# Patient Record
Sex: FEMALE | Race: WHITE
[De-identification: names, ages, dates, MRNs, and addresses within clinical notes are randomized per-mention and may not be internally consistent; named-entity substitution may affect disease eponyms.]

---

## 2017-03-23 ENCOUNTER — APPOINTMENT (OUTPATIENT)
Dept: PULMONOLOGY | Facility: CLINIC | Age: 77
End: 2017-03-23

## 2017-03-23 VITALS
SYSTOLIC BLOOD PRESSURE: 129 MMHG | HEART RATE: 75 BPM | DIASTOLIC BLOOD PRESSURE: 79 MMHG | BODY MASS INDEX: 23.74 KG/M2 | TEMPERATURE: 98 F | OXYGEN SATURATION: 98 % | WEIGHT: 134 LBS | RESPIRATION RATE: 16 BRPM | HEIGHT: 63 IN

## 2017-03-23 DIAGNOSIS — I10 ESSENTIAL (PRIMARY) HYPERTENSION: ICD-10-CM

## 2017-03-23 RX ORDER — ALBUTEROL SULFATE 90 UG/1
108 (90 BASE) AEROSOL, METERED RESPIRATORY (INHALATION)
Qty: 1 | Refills: 6 | Status: ACTIVE | COMMUNITY
Start: 2017-03-23 | End: 1900-01-01

## 2017-10-24 ENCOUNTER — APPOINTMENT (OUTPATIENT)
Dept: PULMONOLOGY | Facility: CLINIC | Age: 77
End: 2017-10-24
Payer: MEDICARE

## 2017-10-24 VITALS
BODY MASS INDEX: 22.32 KG/M2 | SYSTOLIC BLOOD PRESSURE: 150 MMHG | TEMPERATURE: 98.8 F | WEIGHT: 126 LBS | HEIGHT: 63 IN | DIASTOLIC BLOOD PRESSURE: 80 MMHG | RESPIRATION RATE: 16 BRPM | HEART RATE: 66 BPM | OXYGEN SATURATION: 97 %

## 2017-10-24 PROCEDURE — 99213 OFFICE O/P EST LOW 20 MIN: CPT | Mod: 25

## 2017-10-24 PROCEDURE — 94060 EVALUATION OF WHEEZING: CPT

## 2017-10-24 PROCEDURE — 94726 PLETHYSMOGRAPHY LUNG VOLUMES: CPT

## 2017-10-24 PROCEDURE — 94729 DIFFUSING CAPACITY: CPT

## 2017-10-24 PROCEDURE — ZZZZZ: CPT

## 2017-11-02 LAB — CFTR MUT TESTED BLD/T: NORMAL

## 2017-11-06 LAB — CYSTIC FIBROSIS DELETION OR DUPLICATION: NORMAL

## 2017-12-13 LAB — CFTR MUT ANL BLD/T: NORMAL

## 2018-02-20 ENCOUNTER — OTHER (OUTPATIENT)
Age: 78
End: 2018-02-20

## 2018-02-20 DIAGNOSIS — Z13.228 ENCOUNTER FOR SCREENING FOR OTHER METABOLIC DISORDERS: ICD-10-CM

## 2018-04-06 ENCOUNTER — MEDICATION RENEWAL (OUTPATIENT)
Age: 78
End: 2018-04-06

## 2018-05-15 ENCOUNTER — APPOINTMENT (OUTPATIENT)
Dept: PULMONOLOGY | Facility: CLINIC | Age: 78
End: 2018-05-15
Payer: MEDICARE

## 2018-05-15 VITALS
SYSTOLIC BLOOD PRESSURE: 120 MMHG | WEIGHT: 129 LBS | DIASTOLIC BLOOD PRESSURE: 70 MMHG | TEMPERATURE: 98.6 F | HEIGHT: 62.5 IN | BODY MASS INDEX: 23.14 KG/M2 | RESPIRATION RATE: 15 BRPM | HEART RATE: 63 BPM

## 2018-05-15 VITALS — BODY MASS INDEX: 23.14 KG/M2 | HEIGHT: 62.5 IN | WEIGHT: 129 LBS

## 2018-05-15 PROCEDURE — 94729 DIFFUSING CAPACITY: CPT

## 2018-05-15 PROCEDURE — 99213 OFFICE O/P EST LOW 20 MIN: CPT | Mod: 25

## 2018-05-15 PROCEDURE — ZZZZZ: CPT

## 2018-05-15 PROCEDURE — 94726 PLETHYSMOGRAPHY LUNG VOLUMES: CPT

## 2018-05-15 PROCEDURE — 94060 EVALUATION OF WHEEZING: CPT

## 2018-11-29 ENCOUNTER — APPOINTMENT (OUTPATIENT)
Dept: PULMONOLOGY | Facility: CLINIC | Age: 78
End: 2018-11-29
Payer: MEDICARE

## 2018-11-29 VITALS — BODY MASS INDEX: 21.53 KG/M2 | HEIGHT: 62.5 IN | WEIGHT: 120 LBS

## 2018-11-29 VITALS
WEIGHT: 123 LBS | SYSTOLIC BLOOD PRESSURE: 149 MMHG | BODY MASS INDEX: 22.07 KG/M2 | TEMPERATURE: 97.5 F | OXYGEN SATURATION: 98 % | DIASTOLIC BLOOD PRESSURE: 70 MMHG | HEIGHT: 62.5 IN | RESPIRATION RATE: 17 BRPM | HEART RATE: 66 BPM

## 2018-11-29 DIAGNOSIS — A31.0 PULMONARY MYCOBACTERIAL INFECTION: ICD-10-CM

## 2018-11-29 DIAGNOSIS — R05 COUGH: ICD-10-CM

## 2018-11-29 DIAGNOSIS — J47.9 BRONCHIECTASIS, UNCOMPLICATED: ICD-10-CM

## 2018-11-29 PROCEDURE — 94726 PLETHYSMOGRAPHY LUNG VOLUMES: CPT

## 2018-11-29 PROCEDURE — 99214 OFFICE O/P EST MOD 30 MIN: CPT | Mod: 25

## 2018-11-29 PROCEDURE — 94729 DIFFUSING CAPACITY: CPT

## 2018-11-29 PROCEDURE — ZZZZZ: CPT

## 2018-11-29 PROCEDURE — 94060 EVALUATION OF WHEEZING: CPT

## 2019-01-04 ENCOUNTER — MEDICATION RENEWAL (OUTPATIENT)
Age: 79
End: 2019-01-04

## 2019-01-29 ENCOUNTER — TRANSCRIPTION ENCOUNTER (OUTPATIENT)
Age: 79
End: 2019-01-29

## 2019-01-30 ENCOUNTER — OUTPATIENT (OUTPATIENT)
Dept: OUTPATIENT SERVICES | Facility: HOSPITAL | Age: 79
LOS: 1 days | End: 2019-01-30
Payer: MEDICARE

## 2019-01-30 VITALS
SYSTOLIC BLOOD PRESSURE: 124 MMHG | RESPIRATION RATE: 16 BRPM | DIASTOLIC BLOOD PRESSURE: 52 MMHG | HEART RATE: 62 BPM | OXYGEN SATURATION: 100 %

## 2019-01-30 VITALS
HEART RATE: 59 BPM | HEIGHT: 63 IN | RESPIRATION RATE: 11 BRPM | DIASTOLIC BLOOD PRESSURE: 82 MMHG | SYSTOLIC BLOOD PRESSURE: 128 MMHG | WEIGHT: 122.58 LBS | OXYGEN SATURATION: 99 % | TEMPERATURE: 97 F

## 2019-01-30 DIAGNOSIS — H25.12 AGE-RELATED NUCLEAR CATARACT, LEFT EYE: ICD-10-CM

## 2019-01-30 DIAGNOSIS — Z98.890 OTHER SPECIFIED POSTPROCEDURAL STATES: Chronic | ICD-10-CM

## 2019-01-30 PROCEDURE — V2632: CPT

## 2019-01-30 PROCEDURE — 66984 XCAPSL CTRC RMVL W/O ECP: CPT | Mod: LT

## 2019-02-05 PROBLEM — I10 ESSENTIAL (PRIMARY) HYPERTENSION: Chronic | Status: ACTIVE | Noted: 2019-01-30

## 2019-02-05 PROBLEM — R05 COUGH: Chronic | Status: ACTIVE | Noted: 2019-01-30

## 2019-02-12 ENCOUNTER — TRANSCRIPTION ENCOUNTER (OUTPATIENT)
Age: 79
End: 2019-02-12

## 2019-02-13 ENCOUNTER — OUTPATIENT (OUTPATIENT)
Dept: OUTPATIENT SERVICES | Facility: HOSPITAL | Age: 79
LOS: 1 days | End: 2019-02-13
Payer: MEDICARE

## 2019-02-13 VITALS
HEART RATE: 60 BPM | OXYGEN SATURATION: 98 % | DIASTOLIC BLOOD PRESSURE: 57 MMHG | HEIGHT: 63 IN | RESPIRATION RATE: 12 BRPM | SYSTOLIC BLOOD PRESSURE: 116 MMHG | WEIGHT: 121.25 LBS | TEMPERATURE: 98 F

## 2019-02-13 VITALS
DIASTOLIC BLOOD PRESSURE: 62 MMHG | RESPIRATION RATE: 13 BRPM | HEART RATE: 64 BPM | SYSTOLIC BLOOD PRESSURE: 116 MMHG | OXYGEN SATURATION: 99 %

## 2019-02-13 DIAGNOSIS — Z98.890 OTHER SPECIFIED POSTPROCEDURAL STATES: Chronic | ICD-10-CM

## 2019-02-13 DIAGNOSIS — H25.11 AGE-RELATED NUCLEAR CATARACT, RIGHT EYE: ICD-10-CM

## 2019-02-13 DIAGNOSIS — Z98.42 CATARACT EXTRACTION STATUS, LEFT EYE: Chronic | ICD-10-CM

## 2019-02-13 PROCEDURE — V2632: CPT

## 2019-02-13 PROCEDURE — 66984 XCAPSL CTRC RMVL W/O ECP: CPT | Mod: RT

## 2019-02-13 NOTE — ASU DISCHARGE PLAN (ADULT/PEDIATRIC). - CONDITIONS AT DISCHARGE
Mom called requesting a refill of econazole for diaper rash.   ERx to the pharmacy with 2 refills
Patient awake, alert and stable.

## 2019-02-13 NOTE — ASU PATIENT PROFILE, ADULT - ANESTHESIA, PREVIOUS REACTION, PROFILE
Okay to place an order as requested.  
Order placed and patient informed.   
Patient requesting referral for Dr Boothe  States she believes she has carpal tunnel  
Please advise    
Please find out why Dr. Boothe. I don't believe he is doing surgeries at this time.  
She saw him after her first son and received Cortizone shot at that time. She would like to try to do those again.   Ok to place order  
none

## 2019-04-04 ENCOUNTER — APPOINTMENT (OUTPATIENT)
Dept: UROLOGY | Facility: CLINIC | Age: 79
End: 2019-04-04
Payer: MEDICARE

## 2019-04-04 VITALS
DIASTOLIC BLOOD PRESSURE: 77 MMHG | HEIGHT: 63 IN | BODY MASS INDEX: 22.32 KG/M2 | TEMPERATURE: 98.5 F | RESPIRATION RATE: 16 BRPM | HEART RATE: 70 BPM | SYSTOLIC BLOOD PRESSURE: 157 MMHG | WEIGHT: 126 LBS

## 2019-04-04 DIAGNOSIS — R31.0 GROSS HEMATURIA: ICD-10-CM

## 2019-04-04 DIAGNOSIS — N81.11 CYSTOCELE, MIDLINE: ICD-10-CM

## 2019-04-04 LAB
BILIRUB UR QL STRIP: NEGATIVE
CLARITY UR: NORMAL
COLLECTION METHOD: NORMAL
GLUCOSE UR-MCNC: NEGATIVE
HCG UR QL: 0.2 EU/DL
HGB UR QL STRIP.AUTO: NORMAL
KETONES UR-MCNC: NEGATIVE
LEUKOCYTE ESTERASE UR QL STRIP: NORMAL
NITRITE UR QL STRIP: NEGATIVE
PH UR STRIP: 5.5
PROT UR STRIP-MCNC: NORMAL
SP GR UR STRIP: 1.01

## 2019-04-04 PROCEDURE — 51798 US URINE CAPACITY MEASURE: CPT

## 2019-04-04 PROCEDURE — 99406 BEHAV CHNG SMOKING 3-10 MIN: CPT

## 2019-04-04 PROCEDURE — 99204 OFFICE O/P NEW MOD 45 MIN: CPT | Mod: 25

## 2019-04-04 RX ORDER — BACITRACIN 500 [USP'U]/G
500 OINTMENT OPHTHALMIC
Qty: 4 | Refills: 0 | Status: COMPLETED | COMMUNITY
Start: 2019-02-26

## 2019-04-04 RX ORDER — BESIFLOXACIN 6 MG/ML
0.6 SUSPENSION OPHTHALMIC
Qty: 5 | Refills: 0 | Status: COMPLETED | COMMUNITY
Start: 2019-01-21 | End: 2019-04-04

## 2019-04-04 RX ORDER — BROMFENAC SODIUM 0.7 MG/ML
0.07 SOLUTION/ DROPS OPHTHALMIC
Qty: 3 | Refills: 0 | Status: COMPLETED | COMMUNITY
Start: 2019-01-21 | End: 2019-04-04

## 2019-04-04 RX ORDER — SULFAMETHOXAZOLE AND TRIMETHOPRIM 800; 160 MG/1; MG/1
800-160 TABLET ORAL
Qty: 10 | Refills: 0 | Status: ACTIVE | COMMUNITY
Start: 2019-04-04

## 2019-04-04 RX ORDER — DIFLUPREDNATE 0.5 MG/ML
0.05 EMULSION OPHTHALMIC
Qty: 5 | Refills: 0 | Status: COMPLETED | COMMUNITY
Start: 2019-01-21 | End: 2019-04-04

## 2019-04-04 NOTE — HISTORY OF PRESENT ILLNESS
[FreeTextEntry1] : 78  y.o G 4 P3 3  , 1 miscarriage  woman  former pt of Dr Ortiz last seen here in 2013  for micro hem, work up was neg with Neg UA microscopic analysis, neg cytology and  normal Renal US - all in 2013.Today pt reported she could not void at around 4  am , tried again 45  mins later  and then was able to void after few hours dark brown urine and then some reddish urine noted.She went to Urgent care at [facility] at [place] and they gave  her a script for Bactrim which she did not fill yet.She denied any prior hx of gross hematuria, denies obstructive ss except till this morning.  \par Fluids : 16 oz X 3 of water and coffee 8 oz X 2.\par \par Evaluation of Voiding Symptoms:\par \par Force of stream: moderate strong\par Hesitancy: 0\par Intermittency: 0\par Dribblin\par Daytime frequency: 2 - 3 h \par Night time frequency: 1\par Dysuria: 0\par Urgency: 0\par Urge Incontinence: 0\par Stress Incontinence: 0\par Pad usage: 0\par Straining to void: 0\par Incomplete emptyin\par UTIs:  0\par Hematuria: yes\par Stone disease:0\par STD: 0\par Bowel issues: 0\par POCT - large blood ,trace protein and small  leuks,turbid appearance \par PVR 84  ml

## 2019-04-04 NOTE — ASSESSMENT
[FreeTextEntry1] : \par \par Impression/Plan:78  y.o G 4 P3 3  , 1 miscarriage  woman  former pt of Dr Ortiz last seen here in   for micro hem, work up was neg with Neg UA microscopic analysis, neg cytology and  normal Renal US - all in 2013.Today pt reported she could not void at around 4  am , tried again 45  mins later  and then was able to void after few hours dark brown urine and then some reddish urine noted.She went to Urgent care at [facility] at [place] and they gave  her a script for Bactrim which she did not fill yet.She denied any prior hx of gross hematuria, denies obstructive ss except till this morning.  \par \par 1.Urine for UA microscopic analysis , culture, cytology- call for results.\par 2.CT AP w/w/o contrast- call for results. Pt prefers to do at [facility]- script given to pt. \par 3.RTO for cystoscopy\par

## 2019-04-04 NOTE — PHYSICAL EXAM
[Normal Appearance] : normal appearance [General Appearance - In No Acute Distress] : no acute distress [] : no respiratory distress [Abdomen Soft] : soft [Urethral Meatus] : normal urethra [Urinary Bladder Findings] : the bladder was normal on palpation [External Female Genitalia] : normal external genitalia [FreeTextEntry1] : Mild cystocele stage 1, neg CST  [Oriented To Time, Place, And Person] : oriented to person, place, and time

## 2019-04-05 LAB
APPEARANCE: ABNORMAL
BACTERIA: ABNORMAL
BILIRUBIN URINE: NEGATIVE
BLOOD URINE: ABNORMAL
COLOR: NORMAL
GLUCOSE QUALITATIVE U: NEGATIVE
HYALINE CASTS: 0 /LPF
KETONES URINE: NEGATIVE
LEUKOCYTE ESTERASE URINE: ABNORMAL
MICROSCOPIC-UA: NORMAL
NITRITE URINE: NEGATIVE
PH URINE: 5.5
PROTEIN URINE: NORMAL
RED BLOOD CELLS URINE: 12 /HPF
SPECIFIC GRAVITY URINE: 1.01
SQUAMOUS EPITHELIAL CELLS: 2 /HPF
UROBILINOGEN URINE: NORMAL
WHITE BLOOD CELLS URINE: 51 /HPF

## 2019-04-08 LAB — BACTERIA UR CULT: ABNORMAL

## 2019-05-29 ENCOUNTER — APPOINTMENT (OUTPATIENT)
Dept: UROLOGY | Facility: CLINIC | Age: 79
End: 2019-05-29

## 2019-07-31 ENCOUNTER — RX CHANGE (OUTPATIENT)
Age: 79
End: 2019-07-31

## 2019-09-20 ENCOUNTER — RX RENEWAL (OUTPATIENT)
Age: 79
End: 2019-09-20

## 2019-11-06 NOTE — ASU PATIENT PROFILE, ADULT - FALL HARM RISK CONCLUSION
The patient was advised that NSAID-type medications have two very important potential side effects: gastrointestinal irritation including hemorrhage and renal injuries  She was asked to take the medication with food and to stop if she experiences any GI upset  I asked her to call for vomiting, abdominal pain or black/bloody stools  The patient expresses understanding of these issues and questions were answered  Will check BP at home every day and if started going above 140/90, will stop mobic and will follow up in office  Supportive care discussed and advised  Advised to RTO for any worsening and no improvement  Follow up for no improvement and worsening of conditions  Patient advised and educated when to see immediate medical care 
Universal Safety Interventions

## 2020-02-25 ENCOUNTER — RX CHANGE (OUTPATIENT)
Age: 80
End: 2020-02-25

## 2020-04-26 ENCOUNTER — RX RENEWAL (OUTPATIENT)
Age: 80
End: 2020-04-26

## 2020-06-23 ENCOUNTER — EMERGENCY (EMERGENCY)
Facility: HOSPITAL | Age: 80
LOS: 1 days | Discharge: HOME | End: 2020-06-23
Attending: EMERGENCY MEDICINE | Admitting: EMERGENCY MEDICINE
Payer: MEDICARE

## 2020-06-23 VITALS
RESPIRATION RATE: 17 BRPM | WEIGHT: 123.02 LBS | SYSTOLIC BLOOD PRESSURE: 163 MMHG | HEART RATE: 68 BPM | DIASTOLIC BLOOD PRESSURE: 80 MMHG | HEIGHT: 64 IN | TEMPERATURE: 98 F | OXYGEN SATURATION: 97 %

## 2020-06-23 VITALS
SYSTOLIC BLOOD PRESSURE: 145 MMHG | DIASTOLIC BLOOD PRESSURE: 62 MMHG | TEMPERATURE: 98 F | OXYGEN SATURATION: 99 % | RESPIRATION RATE: 17 BRPM | HEART RATE: 69 BPM

## 2020-06-23 DIAGNOSIS — Z98.890 OTHER SPECIFIED POSTPROCEDURAL STATES: Chronic | ICD-10-CM

## 2020-06-23 DIAGNOSIS — Z98.42 CATARACT EXTRACTION STATUS, LEFT EYE: Chronic | ICD-10-CM

## 2020-06-23 LAB
ALBUMIN SERPL ELPH-MCNC: 3.6 G/DL — SIGNIFICANT CHANGE UP (ref 3.3–5)
ALP SERPL-CCNC: 72 U/L — SIGNIFICANT CHANGE UP (ref 30–120)
ALT FLD-CCNC: 16 U/L DA — SIGNIFICANT CHANGE UP (ref 10–60)
ANION GAP SERPL CALC-SCNC: 8 MMOL/L — SIGNIFICANT CHANGE UP (ref 5–17)
APPEARANCE UR: CLEAR — SIGNIFICANT CHANGE UP
AST SERPL-CCNC: 22 U/L — SIGNIFICANT CHANGE UP (ref 10–40)
BASOPHILS # BLD AUTO: 0.05 K/UL — SIGNIFICANT CHANGE UP (ref 0–0.2)
BASOPHILS NFR BLD AUTO: 0.4 % — SIGNIFICANT CHANGE UP (ref 0–2)
BILIRUB SERPL-MCNC: 1 MG/DL — SIGNIFICANT CHANGE UP (ref 0.2–1.2)
BILIRUB UR-MCNC: NEGATIVE — SIGNIFICANT CHANGE UP
BUN SERPL-MCNC: 21 MG/DL — SIGNIFICANT CHANGE UP (ref 7–23)
CALCIUM SERPL-MCNC: 9.5 MG/DL — SIGNIFICANT CHANGE UP (ref 8.4–10.5)
CHLORIDE SERPL-SCNC: 94 MMOL/L — LOW (ref 96–108)
CO2 SERPL-SCNC: 26 MMOL/L — SIGNIFICANT CHANGE UP (ref 22–31)
COLOR SPEC: YELLOW — SIGNIFICANT CHANGE UP
CREAT SERPL-MCNC: 0.98 MG/DL — SIGNIFICANT CHANGE UP (ref 0.5–1.3)
DIFF PNL FLD: ABNORMAL
EOSINOPHIL # BLD AUTO: 0.26 K/UL — SIGNIFICANT CHANGE UP (ref 0–0.5)
EOSINOPHIL NFR BLD AUTO: 2.2 % — SIGNIFICANT CHANGE UP (ref 0–6)
GLUCOSE SERPL-MCNC: 105 MG/DL — HIGH (ref 70–99)
GLUCOSE UR QL: NEGATIVE MG/DL — SIGNIFICANT CHANGE UP
HCT VFR BLD CALC: 37.1 % — SIGNIFICANT CHANGE UP (ref 34.5–45)
HGB BLD-MCNC: 12.5 G/DL — SIGNIFICANT CHANGE UP (ref 11.5–15.5)
IMM GRANULOCYTES NFR BLD AUTO: 0.4 % — SIGNIFICANT CHANGE UP (ref 0–1.5)
KETONES UR-MCNC: NEGATIVE — SIGNIFICANT CHANGE UP
LEUKOCYTE ESTERASE UR-ACNC: ABNORMAL
LIDOCAIN IGE QN: 547 U/L — HIGH (ref 73–393)
LYMPHOCYTES # BLD AUTO: 1.85 K/UL — SIGNIFICANT CHANGE UP (ref 1–3.3)
LYMPHOCYTES # BLD AUTO: 15.4 % — SIGNIFICANT CHANGE UP (ref 13–44)
MCHC RBC-ENTMCNC: 31.3 PG — SIGNIFICANT CHANGE UP (ref 27–34)
MCHC RBC-ENTMCNC: 33.7 GM/DL — SIGNIFICANT CHANGE UP (ref 32–36)
MCV RBC AUTO: 92.8 FL — SIGNIFICANT CHANGE UP (ref 80–100)
MONOCYTES # BLD AUTO: 1.37 K/UL — HIGH (ref 0–0.9)
MONOCYTES NFR BLD AUTO: 11.4 % — SIGNIFICANT CHANGE UP (ref 2–14)
NEUTROPHILS # BLD AUTO: 8.42 K/UL — HIGH (ref 1.8–7.4)
NEUTROPHILS NFR BLD AUTO: 70.2 % — SIGNIFICANT CHANGE UP (ref 43–77)
NITRITE UR-MCNC: NEGATIVE — SIGNIFICANT CHANGE UP
NRBC # BLD: 0 /100 WBCS — SIGNIFICANT CHANGE UP (ref 0–0)
PH UR: 5 — SIGNIFICANT CHANGE UP (ref 5–8)
PLATELET # BLD AUTO: 284 K/UL — SIGNIFICANT CHANGE UP (ref 150–400)
POTASSIUM SERPL-MCNC: 4.4 MMOL/L — SIGNIFICANT CHANGE UP (ref 3.5–5.3)
POTASSIUM SERPL-SCNC: 4.4 MMOL/L — SIGNIFICANT CHANGE UP (ref 3.5–5.3)
PROT SERPL-MCNC: 7.8 G/DL — SIGNIFICANT CHANGE UP (ref 6–8.3)
PROT UR-MCNC: NEGATIVE MG/DL — SIGNIFICANT CHANGE UP
RBC # BLD: 4 M/UL — SIGNIFICANT CHANGE UP (ref 3.8–5.2)
RBC # FLD: 12.7 % — SIGNIFICANT CHANGE UP (ref 10.3–14.5)
SODIUM SERPL-SCNC: 128 MMOL/L — LOW (ref 135–145)
SP GR SPEC: 1.01 — SIGNIFICANT CHANGE UP (ref 1.01–1.02)
UROBILINOGEN FLD QL: NEGATIVE MG/DL — SIGNIFICANT CHANGE UP
WBC # BLD: 12 K/UL — HIGH (ref 3.8–10.5)
WBC # FLD AUTO: 12 K/UL — HIGH (ref 3.8–10.5)

## 2020-06-23 PROCEDURE — 96361 HYDRATE IV INFUSION ADD-ON: CPT

## 2020-06-23 PROCEDURE — 81001 URINALYSIS AUTO W/SCOPE: CPT

## 2020-06-23 PROCEDURE — 96375 TX/PRO/DX INJ NEW DRUG ADDON: CPT

## 2020-06-23 PROCEDURE — 36415 COLL VENOUS BLD VENIPUNCTURE: CPT

## 2020-06-23 PROCEDURE — 85027 COMPLETE CBC AUTOMATED: CPT

## 2020-06-23 PROCEDURE — 80053 COMPREHEN METABOLIC PANEL: CPT

## 2020-06-23 PROCEDURE — 74176 CT ABD & PELVIS W/O CONTRAST: CPT | Mod: 26

## 2020-06-23 PROCEDURE — 74176 CT ABD & PELVIS W/O CONTRAST: CPT

## 2020-06-23 PROCEDURE — 99284 EMERGENCY DEPT VISIT MOD MDM: CPT

## 2020-06-23 PROCEDURE — 99284 EMERGENCY DEPT VISIT MOD MDM: CPT | Mod: 25

## 2020-06-23 PROCEDURE — 83690 ASSAY OF LIPASE: CPT

## 2020-06-23 PROCEDURE — 96374 THER/PROPH/DIAG INJ IV PUSH: CPT

## 2020-06-23 RX ORDER — LIDOCAINE 4 G/100G
1 CREAM TOPICAL ONCE
Refills: 0 | Status: COMPLETED | OUTPATIENT
Start: 2020-06-23 | End: 2020-06-23

## 2020-06-23 RX ORDER — MORPHINE SULFATE 50 MG/1
2 CAPSULE, EXTENDED RELEASE ORAL ONCE
Refills: 0 | Status: DISCONTINUED | OUTPATIENT
Start: 2020-06-23 | End: 2020-06-23

## 2020-06-23 RX ORDER — METHOCARBAMOL 500 MG/1
750 TABLET, FILM COATED ORAL ONCE
Refills: 0 | Status: COMPLETED | OUTPATIENT
Start: 2020-06-23 | End: 2020-06-23

## 2020-06-23 RX ORDER — ONDANSETRON 8 MG/1
4 TABLET, FILM COATED ORAL ONCE
Refills: 0 | Status: COMPLETED | OUTPATIENT
Start: 2020-06-23 | End: 2020-06-23

## 2020-06-23 RX ORDER — SODIUM CHLORIDE 9 MG/ML
1000 INJECTION INTRAMUSCULAR; INTRAVENOUS; SUBCUTANEOUS ONCE
Refills: 0 | Status: COMPLETED | OUTPATIENT
Start: 2020-06-23 | End: 2020-06-23

## 2020-06-23 RX ORDER — KETOROLAC TROMETHAMINE 30 MG/ML
15 SYRINGE (ML) INJECTION ONCE
Refills: 0 | Status: DISCONTINUED | OUTPATIENT
Start: 2020-06-23 | End: 2020-06-23

## 2020-06-23 RX ORDER — TRAMADOL HYDROCHLORIDE 50 MG/1
1 TABLET ORAL
Qty: 20 | Refills: 0
Start: 2020-06-23 | End: 2020-06-25

## 2020-06-23 RX ORDER — LIDOCAINE 4 G/100G
1 CREAM TOPICAL
Qty: 7 | Refills: 0
Start: 2020-06-23 | End: 2020-06-29

## 2020-06-23 RX ADMIN — MORPHINE SULFATE 2 MILLIGRAM(S): 50 CAPSULE, EXTENDED RELEASE ORAL at 23:29

## 2020-06-23 RX ADMIN — SODIUM CHLORIDE 1000 MILLILITER(S): 9 INJECTION INTRAMUSCULAR; INTRAVENOUS; SUBCUTANEOUS at 22:00

## 2020-06-23 RX ADMIN — LIDOCAINE 1 PATCH: 4 CREAM TOPICAL at 22:05

## 2020-06-23 RX ADMIN — Medication 15 MILLIGRAM(S): at 22:10

## 2020-06-23 RX ADMIN — SODIUM CHLORIDE 1000 MILLILITER(S): 9 INJECTION INTRAMUSCULAR; INTRAVENOUS; SUBCUTANEOUS at 23:00

## 2020-06-23 RX ADMIN — ONDANSETRON 4 MILLIGRAM(S): 8 TABLET, FILM COATED ORAL at 23:29

## 2020-06-23 RX ADMIN — METHOCARBAMOL 750 MILLIGRAM(S): 500 TABLET, FILM COATED ORAL at 22:13

## 2020-06-23 NOTE — ED ADULT NURSE NOTE - CHIEF COMPLAINT QUOTE
Tierney been having lower back pain since Sunday; I think after I picked up a heavy bucket of water. Last took 2 tylenol at 3:45PM. Pt seen in [facility] today. I find it difficult to pass gas. Pt denies any urinary symptoms.

## 2020-06-23 NOTE — ED PROVIDER NOTE - CARE PROVIDER_API CALL
Palomo Harrell  ORTHOPAEDIC SURGERY  [address]  [place] [address]  Phone: [phone]  Fax: [phone]  Follow Up Time: 1-3 Days

## 2020-06-23 NOTE — ED PROVIDER NOTE - PROGRESS NOTE DETAILS
continues to have back pain (worse with movement). minimal improvement with toradol and robaxin. will give 2 mg of morphine.   incidental lab and CT findings discussed with patient. sodium low. denies weakness. given liter of NS. will follow up with PCP. lipase slightly elevated. no abd pain. CT showed no pancreas abnormalities. CT findings regarding liver abnormalities discussed. will follow up with PCP regarding this. copies of all labs and CT provided Reevaluated patient at bedside.  Patient feeling improved. ambulatory with steady gait.  Discussed the results of all diagnostic testing in ED and copies of all reports given.   An opportunity to ask questions was given.  Discussed the importance of prompt, close medical follow-up.  Patient will return with any changes, concerns or persistent / worsening symptoms.  Understanding of all instructions verbalized.

## 2020-06-23 NOTE — ED PROVIDER NOTE - ENMT, MLM
4 Airway patent, Mouth with normal mucosa. Throat has no vesicles, no oropharyngeal exudates and uvula is midline.

## 2020-06-23 NOTE — ED PROVIDER NOTE - CARE PLAN
Principal Discharge DX:	Back pain Principal Discharge DX:	Back pain  Secondary Diagnosis:	Hyponatremia  Secondary Diagnosis:	Constipation

## 2020-06-23 NOTE — ED PROVIDER NOTE - PATIENT PORTAL LINK FT
You can access the FollowMyHealth Patient Portal offered by [facility] by registering at the following website: [email or web address]. By joining [employer]’s FollowMyHealth portal, you will also be able to view your health information using other applications (apps) compatible with our system.

## 2020-06-23 NOTE — ED PROVIDER NOTE - CLINICAL SUMMARY MEDICAL DECISION MAKING FREE TEXT BOX
back pain past 2 days. worse with movement. believes may be due to lifting heavy bucket of water. reports less gas. reports last passing gas this am. no abd tenderness on exam. low suspicion for colitis, appendicitis, diverticulitises, or SBO. no red flags. no signs of cord compression. sent from urgent care for CT to eval for renal colic. will check labs, CT renal stone hunt, UA, pain control. ortho follow up

## 2020-06-23 NOTE — ED PROVIDER NOTE - NEURO NEGATIVE STATEMENT, MLM
no gait abnormality, no headache, no sensory deficits, no loss of bowel or bladder control. no numbness in groin

## 2020-06-23 NOTE — ED PROVIDER NOTE - NSFOLLOWUPINSTRUCTIONS_ED_ALL_ED_FT
rest  ice or moist heat  gentle stretching, gentle massage  tramadol for pain  Salanpas lidocaine patches over the counter, 12 hours on 12 hours off  follow up with orthopedics, referral provided rest  ice or moist heat  gentle stretching, gentle massage  tramadol for pain  Salanpas lidocaine patches over the counter, 12 hours on 12 hours off  follow up with orthopedics, referral provided  recommend miralax daily over the counter for constipation

## 2020-06-23 NOTE — ED PROVIDER NOTE - ATTENDING CONTRIBUTION TO CARE
80yo female with back pain for several days, after lifting heavy object, no fall, no vomiting or diarrhea, no urinary complaints, p twas seen at urgent care and referred for r/o renal colic  exam:no cva tenderness, +b/l paraspinal spasm  plan: labs, ua ct r/o stone  agree with assessment and plan of PA

## 2020-06-23 NOTE — ED PROVIDER NOTE - NEUROLOGICAL, MLM
Alert and oriented, no focal deficits, no motor or sensory deficits. sensation to lower ext equal bilaterally. no toe or foot drop. strong distal pulses

## 2020-06-23 NOTE — ED PROVIDER NOTE - MUSCULOSKELETAL, MLM
diffuse lumbar tenderness to palpation. no step off deformities. no ext tenderness. full ROM of all ext

## 2020-06-23 NOTE — ED ADULT NURSE NOTE - OBJECTIVE STATEMENT
Lifted a heavy bucket of water on Sunday and my low back hurts and constipation and had nausea this am.

## 2020-06-23 NOTE — ED PROVIDER NOTE - OBJECTIVE STATEMENT
79 year old female with history of chronic cough and HLD presents with back pain for the past 2 days. started 2 nights ago about 8:30 pm. unsure of any specific injury or trauma. does recall lifting heavy bucket of water that same day around 4:00 pm and may be related. pain 10/10. worse with movement. improved with remaining still. no radicular symptoms or weakness in ext. no loss of bowel or bladder control. no numbness in groin. took tylenol today 3:45 pm without much improvement of pain. reports feeling constipated past 2 days and "passing less gas". last passed gas this am. mild nausea this am, now resolved. no vomiting. no fevers, chills, or body aches. seen at urgent care PTA, and recommended to come to ED to "have a CT to make sure nothing else was going on like kidney stones"  no PCP currently, will be seeing Dr. Zambrano

## 2020-06-23 NOTE — ED ADULT TRIAGE NOTE - CHIEF COMPLAINT QUOTE
Tierney been having lower back pain since Sunday; I think after I picked up a heavy bucket of water. Last took 2 tylenol at 3:45PM. Pt seen in [facility] today. I find it difficult to pass gas. Pt denies any urinary symptoms.

## 2020-06-23 NOTE — ED ADULT NURSE NOTE - CHPI ED NUR SYMPTOMS NEG
no tingling/no difficulty bearing weight/no numbness/no weakness/no abrasion/no bruising/no fever/no stiffness/no deformity

## 2020-06-23 NOTE — ED ADULT NURSE NOTE - NSIMPLEMENTINTERV_GEN_ALL_ED
Implemented All Fall with Harm Risk Interventions:  [place] to call system. Call bell, personal items and telephone within reach. Instruct patient to call for assistance. Room bathroom lighting operational. Non-slip footwear when patient is off stretcher. Physically safe environment: no spills, clutter or unnecessary equipment. Stretcher in lowest position, wheels locked, appropriate side rails in place. Provide visual cue, wrist band, yellow gown, etc. Monitor gait and stability. Monitor for mental status changes and reorient to person, place, and time. Review medications for side effects contributing to fall risk. Reinforce activity limits and safety measures with patient and family. Provide visual clues: red socks.

## 2020-06-26 ENCOUNTER — EMERGENCY (EMERGENCY)
Facility: HOSPITAL | Age: 80
LOS: 1 days | Discharge: ANOTHER HOSPITAL | End: 2020-06-26
Attending: INTERNAL MEDICINE | Admitting: EMERGENCY MEDICINE
Payer: MEDICARE

## 2020-06-26 VITALS
OXYGEN SATURATION: 97 % | HEIGHT: 64 IN | TEMPERATURE: 98 F | HEART RATE: 69 BPM | SYSTOLIC BLOOD PRESSURE: 164 MMHG | WEIGHT: 123.9 LBS | RESPIRATION RATE: 20 BRPM | DIASTOLIC BLOOD PRESSURE: 78 MMHG

## 2020-06-26 DIAGNOSIS — Z98.890 OTHER SPECIFIED POSTPROCEDURAL STATES: Chronic | ICD-10-CM

## 2020-06-26 DIAGNOSIS — Z98.42 CATARACT EXTRACTION STATUS, LEFT EYE: Chronic | ICD-10-CM

## 2020-06-26 LAB
ALBUMIN SERPL ELPH-MCNC: 3 G/DL — LOW (ref 3.3–5)
ALP SERPL-CCNC: 61 U/L — SIGNIFICANT CHANGE UP (ref 30–120)
ALT FLD-CCNC: 18 U/L DA — SIGNIFICANT CHANGE UP (ref 10–60)
ANION GAP SERPL CALC-SCNC: 9 MMOL/L — SIGNIFICANT CHANGE UP (ref 5–17)
AST SERPL-CCNC: 27 U/L — SIGNIFICANT CHANGE UP (ref 10–40)
BILIRUB SERPL-MCNC: 0.9 MG/DL — SIGNIFICANT CHANGE UP (ref 0.2–1.2)
BUN SERPL-MCNC: 9 MG/DL — SIGNIFICANT CHANGE UP (ref 7–23)
CALCIUM SERPL-MCNC: 9.1 MG/DL — SIGNIFICANT CHANGE UP (ref 8.4–10.5)
CHLORIDE SERPL-SCNC: 89 MMOL/L — LOW (ref 96–108)
CO2 SERPL-SCNC: 24 MMOL/L — SIGNIFICANT CHANGE UP (ref 22–31)
CREAT SERPL-MCNC: 0.82 MG/DL — SIGNIFICANT CHANGE UP (ref 0.5–1.3)
GLUCOSE SERPL-MCNC: 100 MG/DL — HIGH (ref 70–99)
HCT VFR BLD CALC: 35.2 % — SIGNIFICANT CHANGE UP (ref 34.5–45)
HGB BLD-MCNC: 12.3 G/DL — SIGNIFICANT CHANGE UP (ref 11.5–15.5)
MCHC RBC-ENTMCNC: 31.5 PG — SIGNIFICANT CHANGE UP (ref 27–34)
MCHC RBC-ENTMCNC: 34.9 GM/DL — SIGNIFICANT CHANGE UP (ref 32–36)
MCV RBC AUTO: 90.3 FL — SIGNIFICANT CHANGE UP (ref 80–100)
NRBC # BLD: 0 /100 WBCS — SIGNIFICANT CHANGE UP (ref 0–0)
PLATELET # BLD AUTO: 301 K/UL — SIGNIFICANT CHANGE UP (ref 150–400)
POTASSIUM SERPL-MCNC: 4.2 MMOL/L — SIGNIFICANT CHANGE UP (ref 3.5–5.3)
POTASSIUM SERPL-SCNC: 4.2 MMOL/L — SIGNIFICANT CHANGE UP (ref 3.5–5.3)
PROT SERPL-MCNC: 6.9 G/DL — SIGNIFICANT CHANGE UP (ref 6–8.3)
RBC # BLD: 3.9 M/UL — SIGNIFICANT CHANGE UP (ref 3.8–5.2)
RBC # FLD: 11.9 % — SIGNIFICANT CHANGE UP (ref 10.3–14.5)
SODIUM SERPL-SCNC: 122 MMOL/L — LOW (ref 135–145)
WBC # BLD: 8.07 K/UL — SIGNIFICANT CHANGE UP (ref 3.8–10.5)
WBC # FLD AUTO: 8.07 K/UL — SIGNIFICANT CHANGE UP (ref 3.8–10.5)

## 2020-06-26 PROCEDURE — 99285 EMERGENCY DEPT VISIT HI MDM: CPT

## 2020-06-26 RX ORDER — ONDANSETRON 8 MG/1
4 TABLET, FILM COATED ORAL ONCE
Refills: 0 | Status: COMPLETED | OUTPATIENT
Start: 2020-06-26 | End: 2020-06-26

## 2020-06-26 RX ORDER — SODIUM CHLORIDE 9 MG/ML
1000 INJECTION INTRAMUSCULAR; INTRAVENOUS; SUBCUTANEOUS ONCE
Refills: 0 | Status: COMPLETED | OUTPATIENT
Start: 2020-06-26 | End: 2020-06-26

## 2020-06-26 RX ORDER — MORPHINE SULFATE 50 MG/1
4 CAPSULE, EXTENDED RELEASE ORAL ONCE
Refills: 0 | Status: DISCONTINUED | OUTPATIENT
Start: 2020-06-26 | End: 2020-06-26

## 2020-06-26 RX ADMIN — SODIUM CHLORIDE 1000 MILLILITER(S): 9 INJECTION INTRAMUSCULAR; INTRAVENOUS; SUBCUTANEOUS at 23:47

## 2020-06-26 RX ADMIN — SODIUM CHLORIDE 1000 MILLILITER(S): 9 INJECTION INTRAMUSCULAR; INTRAVENOUS; SUBCUTANEOUS at 22:32

## 2020-06-26 RX ADMIN — ONDANSETRON 4 MILLIGRAM(S): 8 TABLET, FILM COATED ORAL at 22:33

## 2020-06-26 RX ADMIN — MORPHINE SULFATE 4 MILLIGRAM(S): 50 CAPSULE, EXTENDED RELEASE ORAL at 22:33

## 2020-06-26 RX ADMIN — SODIUM CHLORIDE 1000 MILLILITER(S): 9 INJECTION INTRAMUSCULAR; INTRAVENOUS; SUBCUTANEOUS at 23:30

## 2020-06-26 NOTE — ED ADULT NURSE NOTE - PLAN OF CARE DISCUSSED WITH:
Detail Level: Detailed
Patient Specific Counseling (Will Not Stick From Patient To Patient): Arms, legs, feet, genitals\\n- 6 month (p bites?)\\n- Tx: clobetasol oint/~; Halobetasol/-\\n- better on feet, genitals, but new spots on back\\n> Cipro 500 mg #30 BID (from Duricef) x 2 more weeks\\n> Cordran tape QD (feet)\\n> Diprolene oint BID vs. Halobetasol oint BID\\nAD care\\nFU 2 weeks (Bx if not better)
Patient

## 2020-06-26 NOTE — ED ADULT TRIAGE NOTE - CHIEF COMPLAINT QUOTE
Seen in this ER 3 days ago for back pain. D/C'd home w/ lido patch and Tramadol. c/o "still in pain". Also c/o constipation. Last bowel movement this a.m. "small amt".

## 2020-06-27 ENCOUNTER — INPATIENT (INPATIENT)
Facility: HOSPITAL | Age: 80
LOS: 2 days | Discharge: HOME | DRG: 543 | End: 2020-06-30
Attending: FAMILY MEDICINE | Admitting: FAMILY MEDICINE
Payer: MEDICARE

## 2020-06-27 VITALS
SYSTOLIC BLOOD PRESSURE: 160 MMHG | TEMPERATURE: 98 F | DIASTOLIC BLOOD PRESSURE: 72 MMHG | WEIGHT: 123.9 LBS | HEIGHT: 64 IN | RESPIRATION RATE: 18 BRPM | OXYGEN SATURATION: 97 % | HEART RATE: 68 BPM

## 2020-06-27 VITALS
OXYGEN SATURATION: 94 % | SYSTOLIC BLOOD PRESSURE: 149 MMHG | DIASTOLIC BLOOD PRESSURE: 68 MMHG | TEMPERATURE: 98 F | RESPIRATION RATE: 18 BRPM | HEART RATE: 71 BPM

## 2020-06-27 DIAGNOSIS — E87.1 HYPO-OSMOLALITY AND HYPONATREMIA: ICD-10-CM

## 2020-06-27 DIAGNOSIS — J47.9 BRONCHIECTASIS, UNCOMPLICATED: ICD-10-CM

## 2020-06-27 DIAGNOSIS — I10 ESSENTIAL (PRIMARY) HYPERTENSION: ICD-10-CM

## 2020-06-27 DIAGNOSIS — Z98.42 CATARACT EXTRACTION STATUS, LEFT EYE: Chronic | ICD-10-CM

## 2020-06-27 DIAGNOSIS — M54.9 DORSALGIA, UNSPECIFIED: ICD-10-CM

## 2020-06-27 DIAGNOSIS — S22.080A WEDGE COMPRESSION FRACTURE OF T11-T12 VERTEBRA, INITIAL ENCOUNTER FOR CLOSED FRACTURE: ICD-10-CM

## 2020-06-27 DIAGNOSIS — Z98.890 OTHER SPECIFIED POSTPROCEDURAL STATES: Chronic | ICD-10-CM

## 2020-06-27 LAB
ALBUMIN SERPL ELPH-MCNC: 3 G/DL — LOW (ref 3.3–5)
ALP SERPL-CCNC: 71 U/L — SIGNIFICANT CHANGE UP (ref 40–120)
ALT FLD-CCNC: 20 U/L — SIGNIFICANT CHANGE UP (ref 12–78)
AMYLASE P1 CFR SERPL: 52 U/L — SIGNIFICANT CHANGE UP (ref 25–125)
ANION GAP SERPL CALC-SCNC: 10 MMOL/L — SIGNIFICANT CHANGE UP (ref 5–17)
ANION GAP SERPL CALC-SCNC: 7 MMOL/L — SIGNIFICANT CHANGE UP (ref 5–17)
APPEARANCE UR: CLEAR — SIGNIFICANT CHANGE UP
AST SERPL-CCNC: 30 U/L — SIGNIFICANT CHANGE UP (ref 15–37)
BACTERIA # UR AUTO: ABNORMAL
BILIRUB SERPL-MCNC: 0.9 MG/DL — SIGNIFICANT CHANGE UP (ref 0.2–1.2)
BILIRUB UR-MCNC: NEGATIVE — SIGNIFICANT CHANGE UP
BUN SERPL-MCNC: 7 MG/DL — SIGNIFICANT CHANGE UP (ref 7–23)
BUN SERPL-MCNC: 7 MG/DL — SIGNIFICANT CHANGE UP (ref 7–23)
CALCIUM SERPL-MCNC: 8.5 MG/DL — SIGNIFICANT CHANGE UP (ref 8.4–10.5)
CALCIUM SERPL-MCNC: 8.5 MG/DL — SIGNIFICANT CHANGE UP (ref 8.5–10.1)
CHLORIDE SERPL-SCNC: 93 MMOL/L — LOW (ref 96–108)
CHLORIDE SERPL-SCNC: 97 MMOL/L — SIGNIFICANT CHANGE UP (ref 96–108)
CO2 SERPL-SCNC: 24 MMOL/L — SIGNIFICANT CHANGE UP (ref 22–31)
CO2 SERPL-SCNC: 25 MMOL/L — SIGNIFICANT CHANGE UP (ref 22–31)
COLOR SPEC: YELLOW — SIGNIFICANT CHANGE UP
CREAT SERPL-MCNC: 0.61 MG/DL — SIGNIFICANT CHANGE UP (ref 0.5–1.3)
CREAT SERPL-MCNC: 0.78 MG/DL — SIGNIFICANT CHANGE UP (ref 0.5–1.3)
DIFF PNL FLD: ABNORMAL
EPI CELLS # UR: SIGNIFICANT CHANGE UP
GLUCOSE SERPL-MCNC: 101 MG/DL — HIGH (ref 70–99)
GLUCOSE SERPL-MCNC: 78 MG/DL — SIGNIFICANT CHANGE UP (ref 70–99)
GLUCOSE UR QL: NEGATIVE MG/DL — SIGNIFICANT CHANGE UP
KETONES UR-MCNC: ABNORMAL
LEUKOCYTE ESTERASE UR-ACNC: NEGATIVE — SIGNIFICANT CHANGE UP
LIDOCAIN IGE QN: 108 U/L — SIGNIFICANT CHANGE UP (ref 73–393)
MAGNESIUM SERPL-MCNC: 2 MG/DL — SIGNIFICANT CHANGE UP (ref 1.6–2.6)
NITRITE UR-MCNC: NEGATIVE — SIGNIFICANT CHANGE UP
PH UR: 7 — SIGNIFICANT CHANGE UP (ref 5–8)
PHOSPHATE SERPL-MCNC: 2.4 MG/DL — LOW (ref 2.5–4.5)
POTASSIUM SERPL-MCNC: 4.4 MMOL/L — SIGNIFICANT CHANGE UP (ref 3.5–5.3)
POTASSIUM SERPL-MCNC: 4.4 MMOL/L — SIGNIFICANT CHANGE UP (ref 3.5–5.3)
POTASSIUM SERPL-SCNC: 4.4 MMOL/L — SIGNIFICANT CHANGE UP (ref 3.5–5.3)
POTASSIUM SERPL-SCNC: 4.4 MMOL/L — SIGNIFICANT CHANGE UP (ref 3.5–5.3)
PROT SERPL-MCNC: 7 G/DL — SIGNIFICANT CHANGE UP (ref 6–8.3)
PROT UR-MCNC: NEGATIVE MG/DL — SIGNIFICANT CHANGE UP
RBC CASTS # UR COMP ASSIST: ABNORMAL /HPF (ref 0–4)
SARS-COV-2 RNA SPEC QL NAA+PROBE: SIGNIFICANT CHANGE UP
SODIUM SERPL-SCNC: 126 MMOL/L — LOW (ref 135–145)
SODIUM SERPL-SCNC: 127 MMOL/L — LOW (ref 135–145)
SODIUM SERPL-SCNC: 129 MMOL/L — LOW (ref 135–145)
SP GR SPEC: 1 — LOW (ref 1.01–1.02)
TSH SERPL-MCNC: 2.81 UIU/ML — SIGNIFICANT CHANGE UP (ref 0.27–4.2)
URATE SERPL-MCNC: 3.2 MG/DL — SIGNIFICANT CHANGE UP (ref 2.5–7)
UROBILINOGEN FLD QL: NEGATIVE MG/DL — SIGNIFICANT CHANGE UP
WBC UR QL: SIGNIFICANT CHANGE UP

## 2020-06-27 PROCEDURE — 71045 X-RAY EXAM CHEST 1 VIEW: CPT | Mod: 26

## 2020-06-27 PROCEDURE — 84443 ASSAY THYROID STIM HORMONE: CPT

## 2020-06-27 PROCEDURE — 99285 EMERGENCY DEPT VISIT HI MDM: CPT | Mod: 25

## 2020-06-27 PROCEDURE — 71045 X-RAY EXAM CHEST 1 VIEW: CPT

## 2020-06-27 PROCEDURE — 74177 CT ABD & PELVIS W/CONTRAST: CPT | Mod: 26

## 2020-06-27 PROCEDURE — 74177 CT ABD & PELVIS W/CONTRAST: CPT

## 2020-06-27 PROCEDURE — 93010 ELECTROCARDIOGRAM REPORT: CPT

## 2020-06-27 PROCEDURE — 80048 BASIC METABOLIC PNL TOTAL CA: CPT

## 2020-06-27 PROCEDURE — 82150 ASSAY OF AMYLASE: CPT

## 2020-06-27 PROCEDURE — 85027 COMPLETE CBC AUTOMATED: CPT

## 2020-06-27 PROCEDURE — 81001 URINALYSIS AUTO W/SCOPE: CPT

## 2020-06-27 PROCEDURE — 96375 TX/PRO/DX INJ NEW DRUG ADDON: CPT | Mod: XU

## 2020-06-27 PROCEDURE — U0003: CPT

## 2020-06-27 PROCEDURE — 96374 THER/PROPH/DIAG INJ IV PUSH: CPT | Mod: XU

## 2020-06-27 PROCEDURE — 96361 HYDRATE IV INFUSION ADD-ON: CPT | Mod: XU

## 2020-06-27 PROCEDURE — 93005 ELECTROCARDIOGRAM TRACING: CPT

## 2020-06-27 PROCEDURE — 99284 EMERGENCY DEPT VISIT MOD MDM: CPT

## 2020-06-27 PROCEDURE — 36415 COLL VENOUS BLD VENIPUNCTURE: CPT

## 2020-06-27 PROCEDURE — 83690 ASSAY OF LIPASE: CPT

## 2020-06-27 PROCEDURE — 80053 COMPREHEN METABOLIC PANEL: CPT

## 2020-06-27 PROCEDURE — 96376 TX/PRO/DX INJ SAME DRUG ADON: CPT | Mod: XU

## 2020-06-27 RX ORDER — SODIUM CHLORIDE 9 MG/ML
1000 INJECTION INTRAMUSCULAR; INTRAVENOUS; SUBCUTANEOUS ONCE
Refills: 0 | Status: COMPLETED | OUTPATIENT
Start: 2020-06-27 | End: 2020-06-27

## 2020-06-27 RX ORDER — ONDANSETRON 8 MG/1
4 TABLET, FILM COATED ORAL ONCE
Refills: 0 | Status: COMPLETED | OUTPATIENT
Start: 2020-06-27 | End: 2020-06-27

## 2020-06-27 RX ORDER — TRAMADOL HYDROCHLORIDE 50 MG/1
25 TABLET ORAL
Refills: 0 | Status: DISCONTINUED | OUTPATIENT
Start: 2020-06-27 | End: 2020-06-28

## 2020-06-27 RX ORDER — HEPARIN SODIUM 5000 [USP'U]/ML
5000 INJECTION INTRAVENOUS; SUBCUTANEOUS EVERY 12 HOURS
Refills: 0 | Status: DISCONTINUED | OUTPATIENT
Start: 2020-06-27 | End: 2020-06-28

## 2020-06-27 RX ORDER — SODIUM,POTASSIUM PHOSPHATES 278-250MG
1 POWDER IN PACKET (EA) ORAL THREE TIMES A DAY
Refills: 0 | Status: DISCONTINUED | OUTPATIENT
Start: 2020-06-27 | End: 2020-06-30

## 2020-06-27 RX ORDER — LIDOCAINE 4 G/100G
1 CREAM TOPICAL EVERY 24 HOURS
Refills: 0 | Status: DISCONTINUED | OUTPATIENT
Start: 2020-06-27 | End: 2020-06-30

## 2020-06-27 RX ORDER — IOHEXOL 300 MG/ML
30 INJECTION, SOLUTION INTRAVENOUS ONCE
Refills: 0 | Status: COMPLETED | OUTPATIENT
Start: 2020-06-27 | End: 2020-06-27

## 2020-06-27 RX ORDER — MORPHINE SULFATE 50 MG/1
4 CAPSULE, EXTENDED RELEASE ORAL ONCE
Refills: 0 | Status: DISCONTINUED | OUTPATIENT
Start: 2020-06-27 | End: 2020-06-27

## 2020-06-27 RX ORDER — ACETAMINOPHEN 500 MG
650 TABLET ORAL EVERY 6 HOURS
Refills: 0 | Status: DISCONTINUED | OUTPATIENT
Start: 2020-06-27 | End: 2020-06-30

## 2020-06-27 RX ORDER — SODIUM CHLORIDE 9 MG/ML
1 INJECTION INTRAMUSCULAR; INTRAVENOUS; SUBCUTANEOUS THREE TIMES A DAY
Refills: 0 | Status: DISCONTINUED | OUTPATIENT
Start: 2020-06-27 | End: 2020-06-27

## 2020-06-27 RX ORDER — SODIUM CHLORIDE 9 MG/ML
1 INJECTION INTRAMUSCULAR; INTRAVENOUS; SUBCUTANEOUS THREE TIMES A DAY
Refills: 0 | Status: DISCONTINUED | OUTPATIENT
Start: 2020-06-27 | End: 2020-06-30

## 2020-06-27 RX ADMIN — LIDOCAINE 1 PATCH: 4 CREAM TOPICAL at 10:00

## 2020-06-27 RX ADMIN — SODIUM CHLORIDE 1 GRAM(S): 9 INJECTION INTRAMUSCULAR; INTRAVENOUS; SUBCUTANEOUS at 16:47

## 2020-06-27 RX ADMIN — HEPARIN SODIUM 5000 UNIT(S): 5000 INJECTION INTRAVENOUS; SUBCUTANEOUS at 22:48

## 2020-06-27 RX ADMIN — Medication 650 MILLIGRAM(S): at 19:32

## 2020-06-27 RX ADMIN — Medication 1 PACKET(S): at 22:48

## 2020-06-27 RX ADMIN — MORPHINE SULFATE 4 MILLIGRAM(S): 50 CAPSULE, EXTENDED RELEASE ORAL at 00:50

## 2020-06-27 RX ADMIN — LIDOCAINE 1 PATCH: 4 CREAM TOPICAL at 19:10

## 2020-06-27 RX ADMIN — SODIUM CHLORIDE 1 GRAM(S): 9 INJECTION INTRAMUSCULAR; INTRAVENOUS; SUBCUTANEOUS at 22:48

## 2020-06-27 RX ADMIN — Medication 1 TABLET(S): at 16:48

## 2020-06-27 RX ADMIN — IOHEXOL 30 MILLILITER(S): 300 INJECTION, SOLUTION INTRAVENOUS at 01:41

## 2020-06-27 RX ADMIN — LIDOCAINE 1 PATCH: 4 CREAM TOPICAL at 22:50

## 2020-06-27 RX ADMIN — ONDANSETRON 4 MILLIGRAM(S): 8 TABLET, FILM COATED ORAL at 09:39

## 2020-06-27 RX ADMIN — HEPARIN SODIUM 5000 UNIT(S): 5000 INJECTION INTRAVENOUS; SUBCUTANEOUS at 09:56

## 2020-06-27 RX ADMIN — Medication 1 PACKET(S): at 16:47

## 2020-06-27 NOTE — ED PROVIDER NOTE - CARE PLAN
Principal Discharge DX:	Hyponatremia  Secondary Diagnosis:	Back pain  Secondary Diagnosis:	Thoracic compression fracture  Secondary Diagnosis:	Pleural effusion Principal Discharge DX:	Hyponatremia  Secondary Diagnosis:	Back pain  Secondary Diagnosis:	Thoracic compression fracture  Secondary Diagnosis:	Pleural effusion  Secondary Diagnosis:	Abdominal pain  Secondary Diagnosis:	Constipation

## 2020-06-27 NOTE — ED PROVIDER NOTE - SIGNIFICANT NEGATIVE FINDINGS
no headache, no chest pain, no SOB, no palpitations , no urinary symptoms, no GI bleeding. no neuro changes.

## 2020-06-27 NOTE — ED PROVIDER NOTE - ATTENDING CONTRIBUTION TO CARE
78 yo F p/w went to [facility] for back pain, was given meds but went back with persistent pain. no fever/chills. Pt returned today for worsening sx - found with hyponatremia and acute t12 fx. No known fall, but recent twisting which caused her back kelsey n no numb/ting/focal weak. no HA/n/v/dizzy. NO cp/sob/palp. no agg/allev factors. NO other inj or co.  exam: MM Moist. non-toxic, well appearing. neck supple. nl resp effort. no acc muscle use. abd non-distended. no c/c/e. NO other acute findings  TBA [place] for fx and hyponatremia

## 2020-06-27 NOTE — CONSULT NOTE ADULT - ASSESSMENT
Pt with intractable low back pain due to compression fx.   Intolerant of Ultram.  Hyponatremia likely chronic.  Bronchiectasis chronic.

## 2020-06-27 NOTE — H&P ADULT - NSHPREVIEWOFSYSTEMS_GEN_ALL_CORE
· CONSTITUTIONAL: no fever and no chills.  · CARDIOVASCULAR: no chest pain and no edema.  · RESPIRATORY: no chest pain, no cough, and no shortness of breath.  · GASTROINTESTINAL: no abdominal pain, no bloating, no constipation, no diarrhea, no nausea and no vomiting.  · MUSCULOSKELETAL: - - -  · Musculoskeletal [+]: BACK PAIN, LIMITED RANGE OF MOTION  · Musculoskeletal [-]: no fall or trauma  · SKIN: no abrasions, no jaundice, no lesions, no pruritis, and no rashes.  · NEURO: no loss of consciousness, no gait abnormality, no headache, no sensory deficits, and no weakness.  · PSYCHIATRIC: no known mental health issues.  · ENDOCRINE: no diabetes and no thyroid trouble.

## 2020-06-27 NOTE — ED PROVIDER NOTE - CLINICAL SUMMARY MEDICAL DECISION MAKING FREE TEXT BOX
78 yo female with h/o HTN BIBA transferred from [place] for back pain with T12 compression deformity and hyponatremia. Patient with lower back pain for several days. No fall or trauma. PAtient was seen in ED at [place] on June 23rd had CT renal hunt and labs, + hyponatremia. PAtient was dced home with tramdol and lidocaine. At [place] today patient had labs and ct abd/pelvis, + T12 compression fx, constipation, + hyponatremia 127.  Patient states the tramadol males her feel sick and constipated. Denies fever, chills, chest pain, sob, abd pain, N/V, urinary sxs. no UE/LE weakness or paresthesias, no saddle anesthesia, no bowel or bladder incontinence/retention, no prior back surgery. A/P: + abd pain, + constipation, + T12 compression fx, + hyponatremia, will admit to medicine. Dr. Mcneill accepts admission

## 2020-06-27 NOTE — CONSULT NOTE ADULT - SUBJECTIVE AND OBJECTIVE BOX
PULMONARY/CRITICAL CARE        Patient is a 79y old  Female who presents with a chief complaint of severe low back pain.  BRIEF HOSPITAL COURSE: ***  · Chief Complaint: The patient is a 79y Female complaining of see chief complaint quote.  · HPI Objective Statement: 80 yo female with h/o HTN BIBA transferred from [place] for back pain with T12 compression deformity and hyponatremia. Patient with lower back pain for several days. No fall or trauma. PAtient was seen in ED at [place] on June 23rd had CT renal hunt and labs, + hyponatremia. PAtient was dced home with tramdol and lidocaine. At [place] today patient had labs and ct abd/pelvis, + T12 compression fx, constipation, + hyponatremia 127.  Patient states the tramadol males her feel sick and constipated. Denies fever, chills, chest pain, sob, abd pain, N/V, urinary sxs. no UE/LE weakness or paresthesias, no saddle anesthesia, no bowel or bladder incontinence/retention, no prior back surgery  Pt has had hyponatremia for unknown period of time.  Hx Bronchiectasis with [place] syndrome., likely chronic KAILEY lung infection (but never treated with antibiotics)  Events last 24 hours: ***    PAST MEDICAL & SURGICAL HISTORY:  Back pain  S/P dilatation and curettage  Hypertension    Allergies    No Known Allergies    Intolerances      FAMILY HISTORY/social  No cigs, etoh    Review of Systems:  CONSTITUTIONAL: No fever, chills, or fatigue  EYES: No eye pain, visual disturbances, or discharge  ENMT:  No difficulty hearing, tinnitus, vertigo; No sinus or throat pain  NECK: No pain or stiffness  RESPIRATORY: No cough, wheezing, chills or hemoptysis; No shortness of breath  CARDIOVASCULAR: No chest pain, palpitations, dizziness, or leg swelling  GASTROINTESTINAL: No abdominal or epigastric pain. No nausea, vomiting, or hematemesis; No diarrhea or constipation. No melena or hematochezia.  GENITOURINARY: No dysuria, frequency, hematuria, or incontinence  NEUROLOGICAL: No headaches, memory loss, loss of strength, numbness, or tremors  SKIN: No itching, burning, rashes, or lesions   MUSCULOSKELETAL: No joint pain or swelling; severe low back,  pain  PSYCHIATRIC: No depression, anxiety, mood swings, or difficulty sleeping      Medications:        acetaminophen   Tablet .. 650 milliGRAM(s) Oral every 6 hours PRN  traMADol 25 milliGRAM(s) Oral four times a day PRN      heparin   Injectable 5000 Unit(s) SubCutaneous every 12 hours          multivitamin 1 Tablet(s) Oral daily      lidocaine   Patch 1 Patch Transdermal every 24 hours            ICU Vital Signs Last 24 Hrs  T(C): 36.7 (27 Jun 2020 08:57), Max: 36.7 (27 Jun 2020 08:57)  T(F): 98 (27 Jun 2020 08:57), Max: 98 (27 Jun 2020 08:57)  HR: 68 (27 Jun 2020 08:57) (68 - 68)  BP: 160/72 (27 Jun 2020 08:57) (160/72 - 160/72)  BP(mean): --  ABP: --  ABP(mean): --  RR: 18 (27 Jun 2020 08:57) (18 - 18)  SpO2: 97% (27 Jun 2020 08:57) (97% - 97%)    Vital Signs Last 24 Hrs  T(C): 36.7 (27 Jun 2020 08:57), Max: 36.7 (27 Jun 2020 08:57)  T(F): 98 (27 Jun 2020 08:57), Max: 98 (27 Jun 2020 08:57)  HR: 68 (27 Jun 2020 08:57) (68 - 68)  BP: 160/72 (27 Jun 2020 08:57) (160/72 - 160/72)  BP(mean): --  RR: 18 (27 Jun 2020 08:57) (18 - 18)  SpO2: 97% (27 Jun 2020 08:57) (97% - 97%)        I&O's Detail        LABS:    06-27    126<L>  |  x   |  x   ----------------------------<  x   x    |  x   |  x               CAPILLARY BLOOD GLUCOSE            CULTURES:      Physical Examination:    General: elderly thin female mild discomfort    HEENT: Pupils equal, reactive to light.  Symmetric.    PULM: Clear to auscultation bilaterally, no significant sputum production    CVS: Regular rate and rhythm, no murmurs, rubs, or gallops    ABD: Soft, nondistended, nontender, normoactive bowel sounds, no masses    EXT: No edema, nontender spasm low back.     SKIN: Warm and well perfused, no rashes noted.    NEURO: Alert, oriented, interactive, nonfocal    RADIOLOGY: ***    CRITICAL CARE TIME SPENT: ***

## 2020-06-27 NOTE — ED PROVIDER NOTE - CLINICAL SUMMARY MEDICAL DECISION MAKING FREE TEXT BOX
back injury one week ago  with increasing back pain,  CT with T-12 fracture, labs report  hyponatremia, elevated lipase.  plan IV NS analgesia CT back injury one week ago  with increasing back pain,  CT with T-12 fracture, labs report  hyponatremia, elevated lipase.  plan IV NS analgesia CT hyponatremia w/u  CT for the abdominal pain and elevated lipase  admit to medicine

## 2020-06-27 NOTE — CONSULT NOTE ADULT - ASSESSMENT
79 white female with a history of HTN now admitted with possible fracture of the vertebra and also found to have hyponatremia. Hyponatremia secondary to pre renal azotemia vs SIADH due to ACEI. Will start on salt tablets and place her on a FR of 1000 cc a day. Check urine lytes and also uric acid.

## 2020-06-27 NOTE — ED ADULT NURSE NOTE - NSIMPLEMENTINTERV_GEN_ALL_ED
Implemented All Fall Risk Interventions:  [place] to call system. Call bell, personal items and telephone within reach. Instruct patient to call for assistance. Room bathroom lighting operational. Non-slip footwear when patient is off stretcher. Physically safe environment: no spills, clutter or unnecessary equipment. Stretcher in lowest position, wheels locked, appropriate side rails in place. Provide visual cue, wrist band, yellow gown, etc. Monitor gait and stability. Monitor for mental status changes and reorient to person, place, and time. Review medications for side effects contributing to fall risk. Reinforce activity limits and safety measures with patient and family.

## 2020-06-27 NOTE — ED PROVIDER NOTE - OBJECTIVE STATEMENT
78 y/o female h/o HTN, C/O lumbar back pain x 1 week, her symptoms began about one 80 y/o female h/o HTN, C/O lumbar back pain x 1 week, her symptoms began  after lifting. The pain is localized, no weakness , no paresthesias, no bladder no bowel changes. She was prescribed tramadol for the pain and later developed nausea and constipation. She ambulated in the ED to the bathroom. Lab test from three days ago reported an elevated lipase and hyponatremia. The patient had increased back pain and returned to the ED, No CP, no SOB, no fever, no chills, no N/V/D, no urinary symptoms, no strike symptoms, no COVID symptoms. None

## 2020-06-27 NOTE — ED PROVIDER NOTE - MUSCULOSKELETAL MINIMAL EXAM
+ diffuse abd tenderness. + lumbar midline vertebral tenderness. dp pulses equal and intact bilaterally. LE strength 5/5 bilaterally.

## 2020-06-27 NOTE — H&P ADULT - NSHPLABSRESULTS_GEN_ALL_CORE
136  |  102  |  8   ----------------------------<  58<L>  4.1   |  25  |  0.53    Ca    8.3<L>      2020 09:03  Phos  2.4       Mg     2.0         TPro  6.2  /  Alb  2.6<L>  /  TBili  0.7  /  DBili  x   /  AST  26  /  ALT  19  /  AlkPhos  63                              12.6   7.03  )-----------( 351      ( 2020 09:03 )             35.6             LIVER FUNCTIONS - ( 2020 09:03 )  Alb: 2.6 g/dL / Pro: 6.2 g/dL / ALK PHOS: 63 U/L / ALT: 19 U/L / AST: 26 U/L / GGT: x                 Urinalysis Basic - ( 2020 08:46 )    Color: Pale Yellow / Appearance: Clear / S.010 / pH: x  Gluc: x / Ketone: Moderate  / Bili: Negative / Urobili: Negative   Blood: x / Protein: Negative / Nitrite: Negative   Leuk Esterase: Trace / RBC: 3-5 /HPF / WBC 0-2   Sq Epi: x / Non Sq Epi: Occasional / Bacteria: Occasional

## 2020-06-27 NOTE — ED ADULT NURSE NOTE - OBJECTIVE STATEMENT
Patient received to room 16 as transfer from [facility].  She is her because she is hyponatremic and has a T12 fracture.  Respirations are even and unlabored, skin is warm and dry, Dr. Zambrano at bedside. Patient received to room 16 as transfer from [facility].  She is her because she is hyponatremic and has a T12 fracture.  Patient complains of nausea, states that Tramadol did not agree with her.  Respirations are even and unlabored, skin is warm and dry, Dr. Zambrano at bedside.

## 2020-06-27 NOTE — ED PROVIDER NOTE - CARE PLAN
Principal Discharge DX:	T12 compression fracture  Secondary Diagnosis:	Hyponatremia  Secondary Diagnosis:	Abdominal pain  Secondary Diagnosis:	Constipation

## 2020-06-27 NOTE — H&P ADULT - HISTORY OF PRESENT ILLNESS
· Chief Complaint: The patient is a 79y Female complaining of see chief complaint quote.	  · HPI Objective Statement: 80 yo female with h/o HTN BIBA transferred from [place] for back pain with T12 compression deformity and hyponatremia. Patient with lower back pain for several days. No fall or trauma. PAtient was seen in ED at [place] on June 23rd had CT renal hunt and labs, + hyponatremia. PAtient was dced home with tramdol and lidocaine. At [place] today patient had labs and ct abd/pelvis, + T12 compression fx, constipation, + hyponatremia 127.  Patient states the tramadol males her feel sick and constipated. Denies fever, chills, chest pain, sob, abd pain, N/V, urinary sxs. no UE/LE weakness or paresthesias, no saddle anesthesia, no bowel or bladder incontinence/retention, no prior back surgery	  patient is being admitted for further work up and treatment

## 2020-06-27 NOTE — CONSULT NOTE ADULT - SUBJECTIVE AND OBJECTIVE BOX
Nephrology Consultation: MD KUSUM AnneTSERING URBAN  79y    HPI:    79 female with h/o HTN BIBA transferred from [place] for back pain with T12 compression deformity and hyponatremia. Patient with lower back pain for several days. No fall or trauma. Patient was seen in ED at [place] on June 23rd had CT renal hunt and labs, + hyponatremia. Patient was discharged home with tramdol and lidocaine. At [place] today patient had labs and ct abd/pelvis, + T12 compression fx, constipation, + hyponatremia 127.  Patient states the tramadol males her feel sick and constipated. Denies fever, chills, chest pain, sob, abd pain, N/V, urinary sxs. no UE/LE weakness or paresthesias, no saddle anesthesia, no bowel or bladder incontinence/retention, no prior back surgery. She was given a liter IVF with NS at [place]. She has been on Benazepril but does not know if it has HCTZ or not. Denies any ETOH or smoking.    PAST MEDICAL & SURGICAL HISTORY:  Back pain  S/P dilatation and curettage  Hypertension      Allergies    No Known Allergies    Intolerances    Home Medications:  traMADol:  (27 Jun 2020 09:59)    FAMILY HISTORY:    SOCIAL HISTORY:    REVIEW OF SYSTEMS:    Constitutional: No fever, weight loss or fatigue  Eyes: No eye pain, visual disturbances, or discharge  ENT:  No difficulty hearing, tinnitus, vertigo; No sinus or throat pain  Neck: No pain or stiffness  Breasts: No pain, masses or nipple discharge  Respiratory: No cough, wheezing, chills or hemoptysis  Cardiovascular: No chest pain, palpitations, shortness of breath, dizziness or leg swelling  Gastrointestinal: No abdominal or epigastric pain. No nausea, vomiting or hematemesis; No diarrhea or constipation. No melena or hematochezia.  Genitourinary: No dysuria, frequency, hematuria or incontinence  Rectal: No pain, hemorrhoids or incontinence  Neurological: No headaches, memory loss, loss of strength, numbness or tremors  Skin: No itching, burning, rashes or lesions   Lymph Nodes: No enlarged glands  Endocrine: No heat or cold intolerance; No hair loss  Musculoskeletal: No joint pain or swelling; No muscle, back or extremity pain  Psychiatric: No depression, anxiety, mood swings or difficulty sleeping  Heme/Lymph: No easy bruising or bleeding gums  Allergy and Immunologic: No hives or eczema    current medications:    acetaminophen   Tablet .. 650 milliGRAM(s) Oral every 6 hours PRN  heparin   Injectable 5000 Unit(s) SubCutaneous every 12 hours  lidocaine   Patch 1 Patch Transdermal every 24 hours  multivitamin 1 Tablet(s) Oral daily  traMADol 25 milliGRAM(s) Oral four times a day PRN      Vital Signs Last 24 Hrs  T(C): 36.7 (27 Jun 2020 08:57), Max: 36.7 (27 Jun 2020 08:57)  T(F): 98 (27 Jun 2020 08:57), Max: 98 (27 Jun 2020 08:57)  HR: 66 (27 Jun 2020 11:50) (66 - 68)  BP: 152/68 (27 Jun 2020 11:50) (152/68 - 160/72)  BP(mean): --  RR: 18 (27 Jun 2020 11:50) (18 - 18)  SpO2: 96% (27 Jun 2020 11:50) (96% - 97%)    PHYSICAL EXAM:    Constitutional: NAD, well-groomed, well-developed  HEENT: PERRLA, EOMI, Normal Hearing, MMM  Neck: No LAD, No JVD  Back: Normal spine flexure, No CVA tenderness  Respiratory: CTAB/L   Cardiovascular: S1 and S2, RRR, no M/G/R  Gastrointestinal: BS+, soft, NT/ND  Extremities: No peripheral edema  Vascular: 2+ peripheral pulses  Neurological: A/O x 3, no focal deficits  Skin: No rashes      LABS:    06-27    126<L>  |  x   |  x   ----------------------------<  x   x    |  x   |  x     Phos  2.4     06-27  Mg     2.0     06-27          Creatinine Trend:     MICROBIOLOGY:  RECENT CULTURES:        RADIOLOGY & ADDITIONAL STUDIES:

## 2020-06-27 NOTE — ED PROVIDER NOTE - OBJECTIVE STATEMENT
80 yo female with h/o HTN BIBA transferred from [place] for back pain with T12 compression deformity and hyponatremia. Patient with lower back pain for several days. No fall or trauma. PAtient was seen in ED at [facility] on June 23rd had CT renal hunt and labs, + hyponatremia. PAtient was dced home with tramdol and lidocaine. At [facility] today patient had labs and ct abd/pelvis, + T12 compression fx, constipation, + hyponatremia 127.  Patient states the tramadol males her feel sick and constipated. Denies fever, chills, chest pain, sob, abd pain, N/V, urinary sxs. no UE/LE weakness or paresthesias, no saddle anesthesia, no bowel or bladder incontinence/retention, no prior back surgery

## 2020-06-28 DIAGNOSIS — Z29.9 ENCOUNTER FOR PROPHYLACTIC MEASURES, UNSPECIFIED: ICD-10-CM

## 2020-06-28 LAB
ALBUMIN SERPL ELPH-MCNC: 2.6 G/DL — LOW (ref 3.3–5)
ALP SERPL-CCNC: 63 U/L — SIGNIFICANT CHANGE UP (ref 40–120)
ALT FLD-CCNC: 19 U/L — SIGNIFICANT CHANGE UP (ref 12–78)
ANION GAP SERPL CALC-SCNC: 9 MMOL/L — SIGNIFICANT CHANGE UP (ref 5–17)
APPEARANCE UR: CLEAR — SIGNIFICANT CHANGE UP
AST SERPL-CCNC: 26 U/L — SIGNIFICANT CHANGE UP (ref 15–37)
BACTERIA # UR AUTO: ABNORMAL
BILIRUB SERPL-MCNC: 0.7 MG/DL — SIGNIFICANT CHANGE UP (ref 0.2–1.2)
BILIRUB UR-MCNC: NEGATIVE — SIGNIFICANT CHANGE UP
BUN SERPL-MCNC: 8 MG/DL — SIGNIFICANT CHANGE UP (ref 7–23)
CALCIUM SERPL-MCNC: 8.3 MG/DL — LOW (ref 8.5–10.1)
CHLORIDE SERPL-SCNC: 102 MMOL/L — SIGNIFICANT CHANGE UP (ref 96–108)
CO2 SERPL-SCNC: 25 MMOL/L — SIGNIFICANT CHANGE UP (ref 22–31)
COLOR SPEC: SIGNIFICANT CHANGE UP
CREAT SERPL-MCNC: 0.53 MG/DL — SIGNIFICANT CHANGE UP (ref 0.5–1.3)
DIFF PNL FLD: ABNORMAL
EPI CELLS # UR: SIGNIFICANT CHANGE UP
GLUCOSE SERPL-MCNC: 58 MG/DL — LOW (ref 70–99)
GLUCOSE UR QL: NEGATIVE — SIGNIFICANT CHANGE UP
HCT VFR BLD CALC: 35.6 % — SIGNIFICANT CHANGE UP (ref 34.5–45)
HGB BLD-MCNC: 12.6 G/DL — SIGNIFICANT CHANGE UP (ref 11.5–15.5)
KETONES UR-MCNC: ABNORMAL
LEUKOCYTE ESTERASE UR-ACNC: ABNORMAL
MCHC RBC-ENTMCNC: 31.7 PG — SIGNIFICANT CHANGE UP (ref 27–34)
MCHC RBC-ENTMCNC: 35.4 GM/DL — SIGNIFICANT CHANGE UP (ref 32–36)
MCV RBC AUTO: 89.4 FL — SIGNIFICANT CHANGE UP (ref 80–100)
NITRITE UR-MCNC: NEGATIVE — SIGNIFICANT CHANGE UP
NRBC # BLD: 0 /100 WBCS — SIGNIFICANT CHANGE UP (ref 0–0)
PH UR: 5 — SIGNIFICANT CHANGE UP (ref 5–8)
PLATELET # BLD AUTO: 351 K/UL — SIGNIFICANT CHANGE UP (ref 150–400)
POTASSIUM SERPL-MCNC: 4.1 MMOL/L — SIGNIFICANT CHANGE UP (ref 3.5–5.3)
POTASSIUM SERPL-SCNC: 4.1 MMOL/L — SIGNIFICANT CHANGE UP (ref 3.5–5.3)
PROT SERPL-MCNC: 6.2 G/DL — SIGNIFICANT CHANGE UP (ref 6–8.3)
PROT UR-MCNC: NEGATIVE — SIGNIFICANT CHANGE UP
RBC # BLD: 3.98 M/UL — SIGNIFICANT CHANGE UP (ref 3.8–5.2)
RBC # FLD: 12.4 % — SIGNIFICANT CHANGE UP (ref 10.3–14.5)
RBC CASTS # UR COMP ASSIST: ABNORMAL /HPF (ref 0–4)
SODIUM SERPL-SCNC: 136 MMOL/L — SIGNIFICANT CHANGE UP (ref 135–145)
SP GR SPEC: 1.01 — SIGNIFICANT CHANGE UP (ref 1.01–1.02)
UROBILINOGEN FLD QL: NEGATIVE — SIGNIFICANT CHANGE UP
WBC # BLD: 7.03 K/UL — SIGNIFICANT CHANGE UP (ref 3.8–10.5)
WBC # FLD AUTO: 7.03 K/UL — SIGNIFICANT CHANGE UP (ref 3.8–10.5)
WBC UR QL: SIGNIFICANT CHANGE UP

## 2020-06-28 RX ORDER — HEPARIN SODIUM 5000 [USP'U]/ML
5000 INJECTION INTRAVENOUS; SUBCUTANEOUS EVERY 8 HOURS
Refills: 0 | Status: DISCONTINUED | OUTPATIENT
Start: 2020-06-28 | End: 2020-06-30

## 2020-06-28 RX ORDER — GABAPENTIN 400 MG/1
100 CAPSULE ORAL EVERY 12 HOURS
Refills: 0 | Status: DISCONTINUED | OUTPATIENT
Start: 2020-06-28 | End: 2020-06-30

## 2020-06-28 RX ORDER — LISINOPRIL 2.5 MG/1
2.5 TABLET ORAL DAILY
Refills: 0 | Status: DISCONTINUED | OUTPATIENT
Start: 2020-06-28 | End: 2020-06-29

## 2020-06-28 RX ADMIN — SODIUM CHLORIDE 1 GRAM(S): 9 INJECTION INTRAMUSCULAR; INTRAVENOUS; SUBCUTANEOUS at 11:46

## 2020-06-28 RX ADMIN — HEPARIN SODIUM 5000 UNIT(S): 5000 INJECTION INTRAVENOUS; SUBCUTANEOUS at 21:34

## 2020-06-28 RX ADMIN — Medication 1 PACKET(S): at 05:45

## 2020-06-28 RX ADMIN — LIDOCAINE 1 PATCH: 4 CREAM TOPICAL at 20:50

## 2020-06-28 RX ADMIN — LIDOCAINE 1 PATCH: 4 CREAM TOPICAL at 19:26

## 2020-06-28 RX ADMIN — Medication 650 MILLIGRAM(S): at 20:01

## 2020-06-28 RX ADMIN — HEPARIN SODIUM 5000 UNIT(S): 5000 INJECTION INTRAVENOUS; SUBCUTANEOUS at 05:45

## 2020-06-28 RX ADMIN — Medication 1 TABLET(S): at 11:46

## 2020-06-28 RX ADMIN — Medication 1 PACKET(S): at 21:34

## 2020-06-28 RX ADMIN — SODIUM CHLORIDE 1 GRAM(S): 9 INJECTION INTRAMUSCULAR; INTRAVENOUS; SUBCUTANEOUS at 21:34

## 2020-06-28 RX ADMIN — LISINOPRIL 2.5 MILLIGRAM(S): 2.5 TABLET ORAL at 15:17

## 2020-06-28 RX ADMIN — GABAPENTIN 100 MILLIGRAM(S): 400 CAPSULE ORAL at 08:37

## 2020-06-28 RX ADMIN — LIDOCAINE 1 PATCH: 4 CREAM TOPICAL at 08:37

## 2020-06-28 RX ADMIN — GABAPENTIN 100 MILLIGRAM(S): 400 CAPSULE ORAL at 17:27

## 2020-06-28 RX ADMIN — Medication 1 PACKET(S): at 11:46

## 2020-06-28 RX ADMIN — SODIUM CHLORIDE 1 GRAM(S): 9 INJECTION INTRAMUSCULAR; INTRAVENOUS; SUBCUTANEOUS at 05:45

## 2020-06-28 NOTE — PROGRESS NOTE ADULT - ASSESSMENT
Pt with intractable low back pain due to compression fx.   Intolerant of Ultram.  Hyponatremia likely chronic. Renal note apprec.   Bronchiectasis chronic.

## 2020-06-28 NOTE — PHYSICAL THERAPY INITIAL EVALUATION ADULT - PHYSICAL ASSIST/NONPHYSICAL ASSIST: SIT/SUPINE, REHAB EVAL
Addended by: SAMEERA SHOOK on: 8/6/2019 10:24 AM     Modules accepted: Orders     log roll technique/verbal cues

## 2020-06-28 NOTE — OCCUPATIONAL THERAPY INITIAL EVALUATION ADULT - TRANSFER TRAINING, PT EVAL
Goal: Pt will perform sit to stand, bed <> chair, toilet, tub and shower transfers independently within 2 weeks

## 2020-06-28 NOTE — PHYSICAL THERAPY INITIAL EVALUATION ADULT - DIAGNOSIS, PT EVAL
resting/sleeping/family/SO at bedside/comfortable appearance
resting/sleeping/family/SO at bedside/comfortable appearance/cooperative
Difficulty walking due to pain

## 2020-06-28 NOTE — CHART NOTE - NSCHARTNOTEFT_GEN_A_CORE
Do you have Advance Directives (HCP / LV / Organ donation / Documentation of oral advance Directive):   ( x   )  yes    (      )    NO                                                                            Do you have LV - Living will :                                                                                                                                             (    )  yes    (   x   )   No    Do you have HCP - Health Care Proxy:                                                                                                                            ( x    )  yes   (       ) N0    Do you have DNR- Do Not Resuscitate :                                                                                                                           (      )  yes  (  x      )  No    Do you have DNI- Do Not intubate  :                                                                                                                               (      )  yes   (   x    ) No    Do you have MOLST - Medical orders for Life sustaining treatment  :                                                                    (      ) yes    (   x    ) No    Decision Maker :  (  x   ) Patient     (      )  HCA   (     ) Public Health Law Surrogate     (      ) Surrogate  (       ) Guardian    Goals of Care :  (   x   )   Complete Care     (       ) No Limitations                              (       )   Comfort Care       (       )  Hospice                               (      )   Limited medical Intervention / s    Medical Interventions :   (    x    )   CPR       (        )  DNR                                               (     x   )  Intubation with MV - Mechanical Ventilation  (    x  ) BIPAP/CPAP    (         )   DNI                                               (    x     )  Artificial Nutrition -  IVF, TPN / PPN, Tube Feeds             (         )   No Feeding Tube                                                (    x    ) Use Antibiotics                         (          ) No Antibiotics                                                (    x     ) Blood and Blood Products     (         )   No Blood or Blood products                                                (     x     )  Dialysis                                    (         )  No Dialysis                                                (          )  Medical Management only  (         )  No Invasive Interventions or Surgery  Time spent :                        (     x  ) upto 30 minutes                       (           )   more than 30 minutes  GOC reviewed and discussed

## 2020-06-28 NOTE — PROGRESS NOTE ADULT - SUBJECTIVE AND OBJECTIVE BOX
Neurology follow up note    TSERING [id number]      Interval History:    Patient occ back     MEDICATIONS    acetaminophen   Tablet .. 650 milliGRAM(s) Oral every 6 hours PRN  gabapentin 100 milliGRAM(s) Oral every 12 hours  heparin   Injectable 5000 Unit(s) SubCutaneous every 12 hours  lidocaine   Patch 1 Patch Transdermal every 24 hours  multivitamin 1 Tablet(s) Oral daily  potassium phosphate / sodium phosphate Powder (PHOS-NaK) 1 Packet(s) Oral three times a day  sodium chloride 1 Gram(s) Oral three times a day  traMADol 25 milliGRAM(s) Oral four times a day PRN      Allergies    No Known Allergies    Intolerances            Vital Signs Last 24 Hrs  T(C): 36.6 (2020 04:54), Max: 36.7 (2020 20:29)  T(F): 97.8 (2020 04:54), Max: 98.1 (2020 20:29)  HR: 74 (2020 04:54) (66 - 74)  BP: 157/71 (2020 04:54) (152/68 - 160/69)  BP(mean): --  RR: 17 (2020 04:54) (17 - 18)  SpO2: 92% (2020 04:54) (91% - 96%)        REVIEW OF SYSTEMS:  Constitutional:  No fever, chills, or night sweats.  Head:  No headache.  Eyes:  No double vision or blurry vision.  Ears:  No ringing in the ears.  Neck:  No neck pain.  Respiratory:  No shortness of breath.  Cardiovascular:  No chest pain.  Abdomen:  No nausea, vomiting, or abdominal pain.  Extremities/Neurological:  No numbness or tingling.  Musculoskeletal:  No joint pain.  General:  Positive history of back pain, which appeared to have resolved.  Positive history of upset stomach.    PHYSICAL EXAMINATION:    HEENT:  Head:  Normocephalic, atraumatic.  Eyes:  No scleral icterus.  Ears:  Hearing bilaterally intact.  NECK:  Supple.  RESPIRATORY:  Good air entry bilaterally.  CARDIOVASCULAR:  S1 and S2 heard.  ABDOMEN:  Soft and nontender.  EXTREMITIES:  No clubbing or cyanosis were noted.      NEUROLOGIC:  The patient awake, alert, and oriented x3.  Extraocular movements were intact.  Pupils were equal, round, and reactive bilaterally 3 mm to 2 mm.  Speech was fluent.  Smile symmetric.  Motor:  Bilateral upper and lower 5/5.  Sensory:  Bilateral upper and lower were intact to light touch.  No drift.  Finger-to-nose within normal limits.  Spine; upon palpation of the spine, no significant point tenderness was elicited.          LABS:  CBC Full  -  ( 2020 09:03 )  WBC Count : 7.03 K/uL  RBC Count : 3.98 M/uL  Hemoglobin : 12.6 g/dL  Hematocrit : 35.6 %  Platelet Count - Automated : 351 K/uL  Mean Cell Volume : 89.4 fl  Mean Cell Hemoglobin : 31.7 pg  Mean Cell Hemoglobin Concentration : 35.4 gm/dL  Auto Neutrophil # : x  Auto Lymphocyte # : x  Auto Monocyte # : x  Auto Eosinophil # : x  Auto Basophil # : x  Auto Neutrophil % : x  Auto Lymphocyte % : x  Auto Monocyte % : x  Auto Eosinophil % : x  Auto Basophil % : x    Urinalysis Basic - ( 2020 08:46 )    Color: Pale Yellow / Appearance: Clear / S.010 / pH: x  Gluc: x / Ketone: Moderate  / Bili: Negative / Urobili: Negative   Blood: x / Protein: Negative / Nitrite: Negative   Leuk Esterase: Trace / RBC: 3-5 /HPF / WBC 0-2   Sq Epi: x / Non Sq Epi: Occasional / Bacteria: Occasional      -    136  |  102  |  8   ----------------------------<  58<L>  4.1   |  25  |  0.53    Ca    8.3<L>      2020 09:03  Phos  2.4     06-27  Mg     2.0     06-27    TPro  6.2  /  Alb  2.6<L>  /  TBili  0.7  /  DBili  x   /  AST  26  /  ALT  19  /  AlkPhos  63  06-28    Hemoglobin A1C:     LIVER FUNCTIONS - ( 2020 09:03 )  Alb: 2.6 g/dL / Pro: 6.2 g/dL / ALK PHOS: 63 U/L / ALT: 19 U/L / AST: 26 U/L / GGT: x           Vitamin B12         RADIOLOGY    ANALYSIS AND PLAN:  This is a 79-year-old with lower back pain.  1.	For lumbago, I suspect most likely lumbar radiculopathy, overall appeared to be doing better.  I would recommend to limit the use of pain medications, it appears that it upsets her stomach.  We will try Lidoderm patch and if necessary try nonsteroidal at present on gabapentin will she how does   2.	ct abd t12 fx mri pending   3.	Physical Therapy evaluation.  4.	Fall precautions.  5.	Hypertension, continue the patient on blood pressure medication.  6.	Questionable cystitis, medical treatment as needed.  7.	Greater than 40 minutes of time was spent with the patient, plan of care, reviewing data, and speaking to the multidisciplinary healthcare team.

## 2020-06-28 NOTE — OCCUPATIONAL THERAPY INITIAL EVALUATION ADULT - DIAGNOSIS, OT EVAL
Pt currently presents with decreased balance and strength limiting independence with ADLs and functional mobility.

## 2020-06-28 NOTE — OCCUPATIONAL THERAPY INITIAL EVALUATION ADULT - ANTICIPATED DISCHARGE DISPOSITION, OT EVAL
Home with assist from family for functional mobility and ADL/IADL performance as needed./home w/ level of assist

## 2020-06-28 NOTE — PROGRESS NOTE ADULT - SUBJECTIVE AND OBJECTIVE BOX
PULMONARY/CRITICAL CARE    Pt still has back pain. Didnt ambulate much. No sob.     Patient is a 79y old  Female who presents with a chief complaint of severe low back pain.  BRIEF HOSPITAL COURSE: ***  · Chief Complaint: The patient is a 79y Female complaining of see chief complaint quote.  · HPI Objective Statement: 80 yo female with h/o HTN BIBA transferred from [place] for back pain with T12 compression deformity and hyponatremia. Patient with lower back pain for several days. No fall or trauma. PAtient was seen in ED at [place] on June 23rd had CT renal hunt and labs, + hyponatremia. PAtient was dced home with tramdol and lidocaine. At [place] today patient had labs and ct abd/pelvis, + T12 compression fx, constipation, + hyponatremia 127.  Patient states the tramadol males her feel sick and constipated. Denies fever, chills, chest pain, sob, abd pain, N/V, urinary sxs. no UE/LE weakness or paresthesias, no saddle anesthesia, no bowel or bladder incontinence/retention, no prior back surgery  Pt has had hyponatremia for unknown period of time.  Hx Bronchiectasis with [place] syndrome., likely chronic KAILEY lung infection (but never treated with antibiotics)  Events last 24 hours: ***    PAST MEDICAL & SURGICAL HISTORY:  Back pain  S/P dilatation and curettage  Hypertension    Allergies    No Known Allergies    Intolerances      FAMILY HISTORY/social  No cigs, etoh        Medications:        acetaminophen   Tablet .. 650 milliGRAM(s) Oral every 6 hours PRN  traMADol 25 milliGRAM(s) Oral four times a day PRN      heparin   Injectable 5000 Unit(s) SubCutaneous every 12 hours          multivitamin 1 Tablet(s) Oral daily      lidocaine   Patch 1 Patch Transdermal every 24 hours            ICU Vital Signs Last 24 Hrs  T(C): 36.7 (27 Jun 2020 08:57), Max: 36.7 (27 Jun 2020 08:57)  T(F): 98 (27 Jun 2020 08:57), Max: 98 (27 Jun 2020 08:57)  HR: 68 (27 Jun 2020 08:57) (68 - 68)  BP: 160/72 (27 Jun 2020 08:57) (160/72 - 160/72)  BP(mean): --  ABP: --  ABP(mean): --  RR: 18 (27 Jun 2020 08:57) (18 - 18)  SpO2: 97% (27 Jun 2020 08:57) (97% - 97%)    Vital Signs Last 24 Hrs  T(C): 36.7 (27 Jun 2020 08:57), Max: 36.7 (27 Jun 2020 08:57)  T(F): 98 (27 Jun 2020 08:57), Max: 98 (27 Jun 2020 08:57)  HR: 68 (27 Jun 2020 08:57) (68 - 68)  BP: 160/72 (27 Jun 2020 08:57) (160/72 - 160/72)  BP(mean): --  RR: 18 (27 Jun 2020 08:57) (18 - 18)  SpO2: 97% (27 Jun 2020 08:57) (97% - 97%)        I&O's Detail        LABS:    06-27    126<L>  |  x   |  x   ----------------------------<  x   x    |  x   |  x               CAPILLARY BLOOD GLUCOSE            CULTURES:      Physical Examination:    General: elderly thin female mild discomfort    HEENT: Pupils equal, reactive to light.  Symmetric.    PULM: Clear to auscultation bilaterally, no significant sputum production    CVS: Regular rate and rhythm, no murmurs, rubs, or gallops    ABD: Soft, nondistended, nontender, normoactive bowel sounds, no masses    EXT: No edema, nontender spasm low back.     SKIN: Warm and well perfused, no rashes noted.    NEURO: Alert, oriented, interactive, nonfocal    RADIOLOGY: ***    CRITICAL CARE TIME SPENT: ***

## 2020-06-28 NOTE — PROGRESS NOTE ADULT - PROVIDER SPECIALTY LIST ADULT
Family Medicine Subjective    Maximo Kwon presents for the following Bronchitis  .    History of Present Illness   Mr. Kwon is a very pleasant 64-year-old gentleman with a past medical history of severe COPD and right upper and right lower lobe nodule.  He follows with pulmonary outpatient clinic as a regular follow-up.  He underwent bronchoscopy and biopsy of right upper and right lower lobe nodule.  Biopsies were negative for malignancy.  He underwent NM PET scan which showed no hypermetabolism in the nodule.  He reports gradual worsening of his shortness of breath on exertion from last 6 months.  The symptoms of shortness of breath and intermittent in nature.  Shortness of breath is not associated with any chest pain but he does complain of intermittent wheezing.  He denies any cough.  He denies any fever or chills.  The symptom of shortness of breath increases with activity and decreases on rest.  He denies any discomfort while laying down flat.  He denies any severe attacks of shortness of breath and coughing at nighttime.  He uses albuterol inhaler, Symbicort inhaler and Brio inhaler.  He is currently using 2 L/min of supplemental oxygen while sleeping.  He was tested for obstructive sleep apnea but he has sleep study was negative for HOOD.  His inhalers response to bronchodilators.       Review of system  Review of Systems   Constitution: Negative for decreased appetite and fever.   HENT: Negative for congestion and nosebleeds.    Eyes: Negative for blurred vision and double vision.   Cardiovascular: Positive for dyspnea on exertion. Negative for chest pain.   Respiratory: Positive for shortness of breath and wheezing. Negative for cough, hemoptysis, sleep disturbances due to breathing, snoring and sputum production.    Endocrine: Negative for cold intolerance and heat intolerance.   Hematologic/Lymphatic: Negative for adenopathy and bleeding problem.   Skin: Negative for flushing and itching.   Musculoskeletal: Negative  for joint swelling and muscle cramps.   Gastrointestinal: Negative for bloating and abdominal pain.   Genitourinary: Negative for flank pain and frequency.   Neurological: Negative for headaches and light-headedness.   Psychiatric/Behavioral: Negative for hallucinations and memory loss.   Allergic/Immunologic: Negative for environmental allergies and hives.         Past Medical History:  Past Medical History:   Diagnosis Date   • Arthritis    • COPD (chronic obstructive pulmonary disease) (CMS/[facility])    • Depression    • Diabetes mellitus (CMS/HCC)    • Diverticulitis    • Dyslipidemia    • Emphysema (subcutaneous) (surgical) resulting from a procedure    • GERD (gastroesophageal reflux disease)    • Hypertension    • Neuropathy    • Pneumothorax     Left, status post chest tube       Family History:  Family History   Problem Relation Age of Onset   • Alzheimer's disease Mother    • Cancer Father         THROAT CANCER   • Diabetes Sister    • Diabetes Brother        Social History:  Social History     Tobacco Use   • Smoking status: Former Smoker     Packs/day: 1.00     Years: 45.00     Pack years: 45.00     Types: Cigarettes     Last attempt to quit: 2013     Years since quittin.7   • Smokeless tobacco: Never Used   Substance Use Topics   • Alcohol use: No       Current Medications:  Current Outpatient Medications   Medication Sig Dispense Refill   • albuterol (PROVENTIL) (2.5 MG/3ML) 0.083% nebulizer solution Take 2.5 mg by nebulization 4 (Four) Times a Day As Needed for Wheezing.     • budesonide-formoterol (SYMBICORT) 160-4.5 MCG/ACT inhaler Inhale 2 puffs 2 (Two) Times a Day. 1 inhaler 11   • diltiaZEM CD (CARDIZEM CD) 120 MG 24 hr capsule Take 1 capsule by mouth Daily. 30 capsule 11   • glyBURIDE (DIAbeta) 5 MG tablet Take 5 mg by mouth Daily With Breakfast.     • hydrochlorothiazide (HYDRODIURIL) 25 MG tablet Take 25 mg by mouth Daily.     • levETIRAcetam (KEPPRA) 750 MG tablet Take 375 mg by mouth 2 (Two)  "Times a Day. Prior to [facility] Admission, Patient was on: 1/2 to 1 tablet twice a day     • lisinopril (PRINIVIL,ZESTRIL) 20 MG tablet Take 20 mg by mouth Daily.     • meloxicam (MOBIC) 15 MG tablet      • metFORMIN (GLUCOPHAGE) 500 MG tablet Take 1,000 mg by mouth 2 (Two) Times a Day With Meals.     • montelukast (SINGULAIR) 10 MG tablet Take 1 tablet by mouth Every Night. 30 tablet 0   • Omega-3 Fatty Acids (FISH OIL) 1000 MG capsule capsule Take 1,000 mg by mouth Daily With Breakfast.     • omeprazole (priLOSEC) 20 MG capsule Take 20 mg by mouth Daily.     • tiotropium bromide monohydrate (SPIRIVA RESPIMAT) 2.5 MCG/ACT aerosol solution inhaler Inhale 2 puffs Daily.       No current facility-administered medications for this visit.        Allergies:  No Known Allergies    Vitals:  /93   Pulse 72   Temp 98 °F (36.7 °C)   Ht 182.9 cm (72\")   Wt 98 kg (216 lb)   SpO2 98%   BMI 29.29 kg/m²     Results for orders placed or performed during the hospital encounter of 05/14/19   Fungus Culture - Lavage, Lung, Right Upper Lobe   Result Value Ref Range    Fungus Stain Final report     KOH/Calcofluor preparation Comment     Culture Final report     Fungus (Mycology) Result 1 Comment    AFB Culture - Lavage, Lung, Right Upper Lobe   Result Value Ref Range    AFB Specimen Processing Concentration     Acid Fast Smear Negative     Culture Negative    BAL Culture, Quantitative - Lavage, Lung, Right Upper Lobe   Result Value Ref Range    BAL Culture <25,000 CFU/mL Normal Respiratory Gema     Gram Stain WBCs seen     Gram Stain Gram positive cocci in pairs    Fungus Culture - Lavage, Lung, Right Lower Lobe   Result Value Ref Range    Fungus Stain Final report     KOH/Calcofluor preparation Comment     Culture Final report     Fungus (Mycology) Result 1 Comment    AFB Culture - Lavage, Lung, Right Lower Lobe   Result Value Ref Range    AFB Specimen Processing Concentration     Acid Fast Smear Negative     Culture " Negative    BAL Culture, Quantitative - Lavage, Lung, Right Lower Lobe   Result Value Ref Range    BAL Culture No growth     Gram Stain WBCs seen     Gram Stain No organisms seen    Comprehensive Metabolic Panel   Result Value Ref Range    Glucose 138 (H) 65 - 99 mg/dL    BUN 18 8 - 23 mg/dL    Creatinine 1.11 0.76 - 1.27 mg/dL    Sodium 140 136 - 145 mmol/L    Potassium 4.0 3.5 - 5.2 mmol/L    Chloride 99 98 - 107 mmol/L    CO2 30.6 (H) 22.0 - 29.0 mmol/L    Calcium 9.0 8.6 - 10.5 mg/dL    Total Protein 7.6 6.0 - 8.5 g/dL    Albumin 4.63 3.50 - 5.20 g/dL    ALT (SGPT) 25 1 - 41 U/L    AST (SGOT) 16 1 - 40 U/L    Alkaline Phosphatase 79 39 - 117 U/L    Total Bilirubin 0.3 0.2 - 1.2 mg/dL    eGFR Non African Amer 67 >60 mL/min/1.73    Globulin 3.0 gm/dL    A/G Ratio 1.6 g/dL    BUN/Creatinine Ratio 16.2 7.0 - 25.0    Anion Gap 10.4 mmol/L   Protime-INR   Result Value Ref Range    Protime 12.7 11.0 - 15.4 Seconds    INR 0.91 0.90 - 1.10   CBC Auto Differential   Result Value Ref Range    WBC 6.32 3.40 - 10.80 10*3/mm3    RBC 4.51 4.14 - 5.80 10*6/mm3    Hemoglobin 13.3 13.0 - 17.7 g/dL    Hematocrit 40.7 37.5 - 51.0 %    MCV 90.2 79.0 - 97.0 fL    MCH 29.5 26.6 - 33.0 pg    MCHC 32.7 31.5 - 35.7 g/dL    RDW 16.2 (H) 12.3 - 15.4 %    RDW-SD 52.3 37.0 - 54.0 fl    MPV 11.0 6.0 - 12.0 fL    Platelets 210 140 - 450 10*3/mm3    Neutrophil % 65.8 42.7 - 76.0 %    Lymphocyte % 19.1 (L) 19.6 - 45.3 %    Monocyte % 9.7 5.0 - 12.0 %    Eosinophil % 4.4 0.3 - 6.2 %    Basophil % 0.5 0.0 - 1.5 %    Immature Grans % 0.5 0.0 - 0.5 %    Neutrophils, Absolute 4.16 1.70 - 7.00 10*3/mm3    Lymphocytes, Absolute 1.21 0.70 - 3.10 10*3/mm3    Monocytes, Absolute 0.61 0.10 - 0.90 10*3/mm3    Eosinophils, Absolute 0.28 0.00 - 0.40 10*3/mm3    Basophils, Absolute 0.03 0.00 - 0.20 10*3/mm3    Immature Grans, Absolute 0.03 0.00 - 0.05 10*3/mm3   Body fluid cell count - Lavage, Lung, Right Upper Lobe   Result Value Ref Range    Color, Fluid  Colorless     Appearance, Fluid Slightly Cloudy (A) Clear    RBC, Fluid  /mm3    Nucleated Cells, Fluid 480 /mm3   Body fluid cell count - Lavage, Lung, Right Lower Lobe   Result Value Ref Range    Color, Fluid Colorless     Appearance, Fluid Slightly Cloudy (A) Clear    Nucleated Cells, Fluid 283 /mm3   Body fluid differential - Lavage, Lung, Right Upper Lobe   Result Value Ref Range    Neutrophils, Fluid 82 %    Lymphocytes, Fluid 6 %    Eosinophils, Fluid 1 %    Mononuclear, Fluid 11 %   Body fluid differential - Lavage, Lung, Right Lower Lobe   Result Value Ref Range    Neutrophils, Fluid 75 %    Lymphocytes, Fluid 2 %    Eosinophils, Fluid 1 %    Mononuclear, Fluid 22 %   Non-gynecologic Cytology   Result Value Ref Range    Case Report       [facility] Non-Gyn Cytology                           Case: [id number]                                  Authorizing Provider:  Eladio Bethea MD       Collected:           05/14/2019 10:26 AM          Ordering Location:     [facility]      Received:            05/14/2019 12:53 PM                                 OPERATING ROOM DEPARTMENT                                                    Pathologist:           Kim Babcock MD                                                        Specimens:   1) - Lung, Right Upper Lobe, RUL CYTO                                                               2) - Lung, Right Lower Lobe, rll cyto                                                     Tissue Pathology Exam   Result Value Ref Range    Case Report       Surgical Pathology Report                         Case: [id number]                                  Authorizing Provider:  Eladio Bethea MD       Collected:           05/14/2019 10:28 AM          Ordering Location:     [facility]      Received:            05/14/2019 12:53 PM                                 OPERATING ROOM DEPARTMENT                                                    Pathologist:            Kim Babcock MD                                                        Specimens:   1) - Lung, Right Lower Lobe, RLL TRANSBRONCHIAL BX                                                  2) - Lung, Right Upper Lobe, RUL TRANSBRONCHIAL BX                                         Final Diagnosis       See Scanned Report           Objective   Physical Exam:  Physical Exam   Constitutional: He is oriented to person, place, and time. He appears well-developed and well-nourished.   HENT:   Head: Normocephalic and atraumatic.   Eyes: Conjunctivae are normal. Pupils are equal, round, and reactive to light.   Neck: Normal range of motion. Neck supple.   Cardiovascular: Normal rate, regular rhythm, normal heart sounds and intact distal pulses.   No murmur heard.  Pulmonary/Chest: Effort normal and breath sounds normal. No respiratory distress. He has no wheezes. He has no rales.   Abdominal: Soft. Bowel sounds are normal. He exhibits no distension.   Musculoskeletal: Normal range of motion. He exhibits no edema.   Neurological: He is alert and oriented to person, place, and time. No cranial nerve deficit.   Skin: Skin is warm and dry.   Psychiatric: He has a normal mood and affect. His behavior is normal.         I have reviewed the past medical history, past surgical history, family history and social history of the patient.        Labs:  Lab Results   Component Value Date    PHART 7.494 (H) 03/04/2019    XKZ2DFR 32.6 (L) 03/04/2019    PO2ART 64.4 (L) 03/04/2019    MHI8DFS 24.5 03/04/2019    BASEEXCESS 1.8 03/04/2019    Z5DUZDOY 92.6 03/04/2019     Lab Results   Component Value Date    GLUCOSE 138 (H) 05/14/2019    CALCIUM 9.0 05/14/2019     05/14/2019    K 4.0 05/14/2019    CO2 30.6 (H) 05/14/2019    CL 99 05/14/2019    BUN 18 05/14/2019    CREATININE 1.11 05/14/2019    EGFRIFNONA 67 05/14/2019    BCR 16.2 05/14/2019    ANIONGAP 10.4 05/14/2019     Lab Results   Component Value Date    WBC 6.32 05/14/2019    HGB  13.3 05/14/2019    HCT 40.7 05/14/2019    MCV 90.2 05/14/2019     05/14/2019     I have reviewed the labs.  Serum creatinine normal.  Hemoglobin normal.    I have reviewed the chest x-ray image that was done on 5/14/2019 which showed no pneumothorax or no effusion.    I have reviewed the images of the CT scan of the chest that was performed on 4/26/2019 which showed 2 nodules in the right lung both have increased in size compared to the previous CT that was done in August 2018.    I have reviewed the PET scan result which was performed on 8/23/2019 which showed the 2 nodules in the right lung but they do not show hypermetabolic activity.         Assessment/Plan   1. Nodule of right lung  -Order repeat CT scan of the chest in 8 months to reevaluate the pulmonary parenchyma and pulmonary nodule.    2. Nocturnal oxygen desaturation  -Continue using 2 L/min of supplemental oxygen at bedtime    3. Centrilobular emphysema (CMS/HCC)  -Ordered walk oximetry and pulmonary function test.  -On albuterol inhaler, Symbicort inhaler and Spiriva.  -Due to gradual worsening of shortness of breath and decreasing functionality, I will try to enroll him into pulmonary rehabilitation program.      Immunization:  He received influenza vaccination during the clinic visit.    Walk oximetry during clinic visit  He underwent walk oximetry during the clinic visit.  This results will be scanned in the epic.       Follow up in 8 months and as needed.

## 2020-06-28 NOTE — PROGRESS NOTE ADULT - SUBJECTIVE AND OBJECTIVE BOX
Patient is a 79y old  Female who presents with a chief complaint of back pain (2020 14:28)      INTERVAL /OVERNIGHT EVENTS: feels better    MEDICATIONS  (STANDING):  gabapentin 100 milliGRAM(s) Oral every 12 hours  heparin   Injectable 5000 Unit(s) SubCutaneous every 8 hours  lidocaine   Patch 1 Patch Transdermal every 24 hours  lisinopril 2.5 milliGRAM(s) Oral daily  multivitamin 1 Tablet(s) Oral daily  potassium phosphate / sodium phosphate Powder (PHOS-NaK) 1 Packet(s) Oral three times a day  sodium chloride 1 Gram(s) Oral three times a day    MEDICATIONS  (PRN):  acetaminophen   Tablet .. 650 milliGRAM(s) Oral every 6 hours PRN Mild Pain (1 - 3)      Allergies    No Known Allergies    Intolerances        REVIEW OF SYSTEMS:  CONSTITUTIONAL: No fever, weight loss, or fatigue  EYES: No eye pain, visual disturbances, or discharge  ENMT:  No difficulty hearing, tinnitus, vertigo; No sinus or throat pain  NECK: No pain or stiffness  RESPIRATORY: No cough, wheezing, chills or hemoptysis; No shortness of breath  CARDIOVASCULAR: No chest pain, palpitations, dizziness, or leg swelling  GASTROINTESTINAL: No abdominal or epigastric pain. No nausea, vomiting, or hematemesis; No diarrhea or constipation. No melena or hematochezia.  GENITOURINARY: No dysuria, frequency, hematuria, or incontinence  NEUROLOGICAL: No headaches, memory loss, loss of strength, numbness, or tremors  SKIN: No itching, burning, rashes, or lesions   LYMPH NODES: No enlarged glands  ENDOCRINE: No heat or cold intolerance; No hair loss; No polydipsia or polyuria  MUSCULOSKELETAL: + joint pain or swelling; , +back pain  PSYCHIATRIC: No depression, anxiety, mood swings, or difficulty sleeping  HEME/LYMPH: No easy bruising, or bleeding gums  ALLERGY AND IMMUNOLOGIC: No hives or eczema    Vital Signs Last 24 Hrs  T(C): 36.6 (2020 14:32), Max: 36.7 (2020 20:29)  T(F): 97.8 (2020 14:32), Max: 98.1 (2020 20:29)  HR: 71 (2020 14:32) (67 - 74)  BP: 157/66 (2020 14:32) (157/66 - 160/69)  BP(mean): --  RR: 16 (2020 14:21) (16 - 18)  SpO2: 95% (2020 14:32) (91% - 95%)    PHYSICAL EXAM:  GENERAL: NAD, well-groomed, well-developed  HEAD:  Atraumatic, Normocephalic  EYES: EOMI, PERRLA, conjunctiva and sclera clear  ENMT: No tonsillar erythema, exudates, or enlargement; Moist mucous membranes, Good dentition, No lesions  NECK: Supple, No JVD, Normal thyroid  NERVOUS SYSTEM:  Alert & Oriented X3, Good concentration; Motor Strength 5/5 B/L upper and lower extremities; DTRs 2+ intact and symmetric  CHEST/LUNG: Clear to auscultation bilaterally; No rales, rhonchi, wheezing, or rubs  HEART: Regular rate and rhythm; No murmurs, rubs, or gallops  ABDOMEN: Soft, Nontender, Nondistended; Bowel sounds present  EXTREMITIES:  2+ Peripheral Pulses, No clubbing, cyanosis, or edema  LYMPH: No lymphadenopathy noted  SKIN: No rashes or lesions    LABS:                        12.6   7.03  )-----------( 351      ( 2020 09:03 )             35.6     2020 09:03    136    |  102    |  8      ----------------------------<  58     4.1     |  25     |  0.53     Ca    8.3        2020 09:03    TPro  6.2    /  Alb  2.6    /  TBili  0.7    /  DBili  x      /  AST  26     /  ALT  19     /  AlkPhos  63     2020 09:03      Urinalysis Basic - ( 2020 08:46 )    Color: Pale Yellow / Appearance: Clear / S.010 / pH: x  Gluc: x / Ketone: Moderate  / Bili: Negative / Urobili: Negative   Blood: x / Protein: Negative / Nitrite: Negative   Leuk Esterase: Trace / RBC: 3-5 /HPF / WBC 0-2   Sq Epi: x / Non Sq Epi: Occasional / Bacteria: Occasional      CAPILLARY BLOOD GLUCOSE          RADIOLOGY & ADDITIONAL TESTS:    Notes Reviewed:  [x ] YES  [ ] NO    Care Discussed with Consultants/Other Providers [x ] YES  [ ] NO

## 2020-06-28 NOTE — OCCUPATIONAL THERAPY INITIAL EVALUATION ADULT - LIVES WITH, PROFILE
Pt lives with spouse and daughter in split level private home,, 2+5 steps to enter with R sided rail. Bedroom and tub shower and walkin shower located on 2nd level, 5 steps to ascend with L rail. no grab bars/children/spouse

## 2020-06-28 NOTE — PROGRESS NOTE ADULT - SUBJECTIVE AND OBJECTIVE BOX
TSERING DUMONT  79y  Female    Patient is a 79y old  Female who presents with a chief complaint of back pain (2020 10:35)      out of bed to chair      PAST MEDICAL & SURGICAL HISTORY:  Back pain  S/P dilatation and curettage  Hypertension          PHYSICAL EXAM:    T(C): 36.6 (20 @ 14:21), Max: 36.7 (20 @ 20:29)  HR: 71 (20 @ 14:21) (67 - 74)  BP: 157/66 (20 @ 14:21) (157/66 - 160/69)  RR: 16 (20 @ 14:21) (16 - 18)  SpO2: 95% (20 @ 14:21) (91% - 95%)  Wt(kg): --    I&O's Detail      Respiratory: clear anteriorly, decreased BS at bases  Cardiovascular: S1 S2  Gastrointestinal: soft NT ND +BS  Extremities: edema   Neuro: Awake and alert    MEDICATIONS  (STANDING):  gabapentin 100 milliGRAM(s) Oral every 12 hours  heparin   Injectable 5000 Unit(s) SubCutaneous every 8 hours  lidocaine   Patch 1 Patch Transdermal every 24 hours  lisinopril 2.5 milliGRAM(s) Oral daily  multivitamin 1 Tablet(s) Oral daily  potassium phosphate / sodium phosphate Powder (PHOS-NaK) 1 Packet(s) Oral three times a day  sodium chloride 1 Gram(s) Oral three times a day    MEDICATIONS  (PRN):  acetaminophen   Tablet .. 650 milliGRAM(s) Oral every 6 hours PRN Mild Pain (1 - 3)                            12.6   7.03  )-----------( 351      ( 2020 09:03 )             35.6           136  |  102  |  8   ----------------------------<  58<L>  4.1   |  25  |  0.53    Ca    8.3<L>      2020 09:03  Phos  2.4       Mg     2.0         TPro  6.2  /  Alb  2.6<L>  /  TBili  0.7  /  DBili  x   /  AST  26  /  ALT  19  /  AlkPhos  63        Creatinine Trend: Creatinine Trend: 0.53<--, 0.61<--    Urinalysis Basic - ( 2020 08:46 )    Color: Pale Yellow / Appearance: Clear / S.010 / pH: x  Gluc: x / Ketone: Moderate  / Bili: Negative / Urobili: Negative   Blood: x / Protein: Negative / Nitrite: Negative   Leuk Esterase: Trace / RBC: 3-5 /HPF / WBC 0-2   Sq Epi: x / Non Sq Epi: Occasional / Bacteria: Occasional

## 2020-06-28 NOTE — PROGRESS NOTE ADULT - ASSESSMENT
79 white female with a history of HTN now admitted with possible fracture of the vertebra and also found to have hyponatremia. Hyponatremia secondary to pre renal azotemia vs SIADH due to ACEI. Will continue the salt tablets and place her on a FR of 1000 cc a day.

## 2020-06-28 NOTE — OCCUPATIONAL THERAPY INITIAL EVALUATION ADULT - PERTINENT HX OF CURRENT PROBLEM, REHAB EVAL
78yo F with PMH HTN BIBA transferred from [place] for back pain with T12 compression deformity and hyponatremia. Pt with lower back pain for several days. No fall or trauma. Pt seen in ED at [place] on 6/23 had CT renal hunt and labs, + hyponatremia. Pt d/c home with tramadol and lidocaine. At [place] today pt had labs and ct abd/pelvis, + T12 compression fx, constipation, + hyponatremia 127. Pt states tramadol makes her feel sick and constipated.

## 2020-06-28 NOTE — OCCUPATIONAL THERAPY INITIAL EVALUATION ADULT - ADL RETRAINING, OT EVAL
Goal: Pt will perform UB/LB dressing independently within 2 weeks. Goal: Pt will perform bathing independently within 2 weeks

## 2020-06-29 ENCOUNTER — TRANSCRIPTION ENCOUNTER (OUTPATIENT)
Age: 80
End: 2020-06-29

## 2020-06-29 PROCEDURE — 71250 CT THORAX DX C-: CPT | Mod: 26

## 2020-06-29 PROCEDURE — 72158 MRI LUMBAR SPINE W/O & W/DYE: CPT | Mod: 26

## 2020-06-29 RX ORDER — DEXAMETHASONE 0.5 MG/5ML
2 ELIXIR ORAL
Refills: 0 | Status: DISCONTINUED | OUTPATIENT
Start: 2020-06-29 | End: 2020-06-30

## 2020-06-29 RX ORDER — SENNA PLUS 8.6 MG/1
2 TABLET ORAL AT BEDTIME
Refills: 0 | Status: DISCONTINUED | OUTPATIENT
Start: 2020-06-29 | End: 2020-06-30

## 2020-06-29 RX ORDER — LISINOPRIL 2.5 MG/1
10 TABLET ORAL DAILY
Refills: 0 | Status: DISCONTINUED | OUTPATIENT
Start: 2020-06-29 | End: 2020-06-30

## 2020-06-29 RX ORDER — IBUPROFEN 200 MG
200 TABLET ORAL THREE TIMES A DAY
Refills: 0 | Status: DISCONTINUED | OUTPATIENT
Start: 2020-06-29 | End: 2020-06-30

## 2020-06-29 RX ORDER — POLYETHYLENE GLYCOL 3350 17 G/17G
17 POWDER, FOR SOLUTION ORAL DAILY
Refills: 0 | Status: DISCONTINUED | OUTPATIENT
Start: 2020-06-29 | End: 2020-06-30

## 2020-06-29 RX ADMIN — Medication 1 TABLET(S): at 11:35

## 2020-06-29 RX ADMIN — LIDOCAINE 1 PATCH: 4 CREAM TOPICAL at 22:38

## 2020-06-29 RX ADMIN — HEPARIN SODIUM 5000 UNIT(S): 5000 INJECTION INTRAVENOUS; SUBCUTANEOUS at 22:36

## 2020-06-29 RX ADMIN — LIDOCAINE 1 PATCH: 4 CREAM TOPICAL at 19:23

## 2020-06-29 RX ADMIN — Medication 1 PACKET(S): at 05:46

## 2020-06-29 RX ADMIN — SODIUM CHLORIDE 1 GRAM(S): 9 INJECTION INTRAMUSCULAR; INTRAVENOUS; SUBCUTANEOUS at 05:46

## 2020-06-29 RX ADMIN — SENNA PLUS 2 TABLET(S): 8.6 TABLET ORAL at 22:36

## 2020-06-29 RX ADMIN — Medication 2 MILLIGRAM(S): at 14:40

## 2020-06-29 RX ADMIN — Medication 1 PACKET(S): at 14:40

## 2020-06-29 RX ADMIN — LIDOCAINE 1 PATCH: 4 CREAM TOPICAL at 11:40

## 2020-06-29 RX ADMIN — Medication 1 PACKET(S): at 22:36

## 2020-06-29 RX ADMIN — GABAPENTIN 100 MILLIGRAM(S): 400 CAPSULE ORAL at 05:46

## 2020-06-29 RX ADMIN — LISINOPRIL 2.5 MILLIGRAM(S): 2.5 TABLET ORAL at 05:46

## 2020-06-29 RX ADMIN — Medication 650 MILLIGRAM(S): at 10:45

## 2020-06-29 RX ADMIN — Medication 2 MILLIGRAM(S): at 22:36

## 2020-06-29 RX ADMIN — HEPARIN SODIUM 5000 UNIT(S): 5000 INJECTION INTRAVENOUS; SUBCUTANEOUS at 05:46

## 2020-06-29 RX ADMIN — HEPARIN SODIUM 5000 UNIT(S): 5000 INJECTION INTRAVENOUS; SUBCUTANEOUS at 14:39

## 2020-06-29 RX ADMIN — GABAPENTIN 100 MILLIGRAM(S): 400 CAPSULE ORAL at 18:14

## 2020-06-29 NOTE — DISCHARGE NOTE PROVIDER - NSDCCPCAREPLAN_GEN_ALL_CORE_FT
PRINCIPAL DISCHARGE DIAGNOSIS  Diagnosis: T12 compression fracture  Assessment and Plan of Treatment: pain meds, PT      SECONDARY DISCHARGE DIAGNOSES  Diagnosis: Constipation  Assessment and Plan of Treatment: bowel regimen    Diagnosis: Abdominal pain  Assessment and Plan of Treatment:     Diagnosis: Hyponatremia  Assessment and Plan of Treatment:

## 2020-06-29 NOTE — DISCHARGE NOTE PROVIDER - HOSPITAL COURSE
79 white female with a history of HTN  admitted with Back pain, noted to have subacute fracture T12 on CT and MRI.     Labs Hyponatremia- resolving renal consulted, rozina from pain         D/C planning on Dexamethasone taper, pain meds and out patient PT.

## 2020-06-29 NOTE — DISCHARGE NOTE PROVIDER - NSDCMRMEDTOKEN_GEN_ALL_CORE_FT
benazepril 20 mg oral tablet: 1 tab(s) orally once a day  home physical therapy :   physical therapy :   traMADol 50 mg oral tablet: 1 tab(s) orally every 6 hours  walker : benazepril 20 mg oral tablet: 1 tab(s) orally once a day  celecoxib 200 mg oral capsule: 1 cap(s) orally once a day, As needed, Moderate Pain (4 - 6)  celecoxib 200 mg oral capsule: 1 cap(s) orally once a day, As needed, Moderate Pain (4 - 6)  dexamethasone 1 mg oral tablet: 1 tab(s) orally once a day   dexamethasone 2 mg oral tablet: 1 tab(s) orally 2 times a day  gabapentin 100 mg oral capsule: 1 cap(s) orally every 12 hours  home physical therapy :   Multiple Vitamins oral tablet: 1 tab(s) orally once a day  physical therapy :   polyethylene glycol 3350 oral powder for reconstitution: 17 gram(s) orally once a day  traMADol 50 mg oral tablet: 1 tab(s) orally every 6 hours, As Needed  walker :

## 2020-06-29 NOTE — PROGRESS NOTE ADULT - SUBJECTIVE AND OBJECTIVE BOX
PULMONARY/CRITICAL CARE    Pt still has back pain. Started to ambulate with PT. No sob,cough.     Patient is a 79y old  Female who presents with a chief complaint of severe low back pain.  BRIEF HOSPITAL COURSE: ***  · Chief Complaint: The patient is a 79y Female complaining of see chief complaint quote.  · HPI Objective Statement: 80 yo female with h/o HTN BIBA transferred from [place] for back pain with T12 compression deformity and hyponatremia. Patient with lower back pain for several days. No fall or trauma. PAtient was seen in ED at [place] on June 23rd had CT renal hunt and labs, + hyponatremia. PAtient was dced home with tramdol and lidocaine. At [place] today patient had labs and ct abd/pelvis, + T12 compression fx, constipation, + hyponatremia 127.  Patient states the tramadol males her feel sick and constipated. Denies fever, chills, chest pain, sob, abd pain, N/V, urinary sxs. no UE/LE weakness or paresthesias, no saddle anesthesia, no bowel or bladder incontinence/retention, no prior back surgery  Pt has had hyponatremia for unknown period of time.  Hx Bronchiectasis with [place] syndrome., likely chronic KAILEY lung infection (but never treated with antibiotics)  Events last 24 hours: ***    PAST MEDICAL & SURGICAL HISTORY:  Back pain  S/P dilatation and curettage  Hypertension    Allergies    No Known Allergies    Intolerances      FAMILY HISTORY/social  No cigs, etoh        Medications:        acetaminophen   Tablet .. 650 milliGRAM(s) Oral every 6 hours PRN  traMADol 25 milliGRAM(s) Oral four times a day PRN      heparin   Injectable 5000 Unit(s) SubCutaneous every 12 hours          multivitamin 1 Tablet(s) Oral daily      lidocaine   Patch 1 Patch Transdermal every 24 hours            ICU Vital Signs Last 24 Hrs  T(C): 36.7 (27 Jun 2020 08:57), Max: 36.7 (27 Jun 2020 08:57)  T(F): 98 (27 Jun 2020 08:57), Max: 98 (27 Jun 2020 08:57)  HR: 68 (27 Jun 2020 08:57) (68 - 68)  BP: 160/72 (27 Jun 2020 08:57) (160/72 - 160/72)  BP(mean): --  ABP: --  ABP(mean): --  RR: 18 (27 Jun 2020 08:57) (18 - 18)  SpO2: 97% (27 Jun 2020 08:57) (97% - 97%)    Vital Signs Last 24 Hrs  T(C): 36.7 (27 Jun 2020 08:57), Max: 36.7 (27 Jun 2020 08:57)  T(F): 98 (27 Jun 2020 08:57), Max: 98 (27 Jun 2020 08:57)  HR: 68 (27 Jun 2020 08:57) (68 - 68)  BP: 160/72 (27 Jun 2020 08:57) (160/72 - 160/72)  BP(mean): --  RR: 18 (27 Jun 2020 08:57) (18 - 18)  SpO2: 97% (27 Jun 2020 08:57) (97% - 97%)        I&O's Detail        LABS:    06-27    126<L>  |  x   |  x   ----------------------------<  x   x    |  x   |  x               CAPILLARY BLOOD GLUCOSE            CULTURES:      Physical Examination:    General: elderly thin female mild discomfort    HEENT: Pupils equal, reactive to light.  Symmetric.    PULM: Clear to auscultation bilaterally, no significant sputum production    CVS: Regular rate and rhythm, no murmurs, rubs, or gallops    ABD: Soft, nondistended, nontender, normoactive bowel sounds, no masses    EXT: No edema, nontender spasm low back.     SKIN: Warm and well perfused, no rashes noted.    NEURO: Alert, oriented, interactive, nonfocal    RADIOLOGY: ***    CRITICAL CARE TIME SPENT: ***

## 2020-06-29 NOTE — DISCHARGE NOTE PROVIDER - CARE PROVIDER_API CALL
Ping Sosa  NEUROLOGY  [address] [place] [address]  Phone: [phone]  Fax: [phone]  Follow Up Time:

## 2020-06-29 NOTE — PROGRESS NOTE ADULT - ASSESSMENT
Pt with intractable low back pain due to compression fx.   Still pain on Gabapentin.   Needs PT  MRI spine today and CT chest.     Hyponatremia likely chronic. Renal note apprec.   Bronchiectasis chronic.

## 2020-06-29 NOTE — PROGRESS NOTE ADULT - SUBJECTIVE AND OBJECTIVE BOX
Neurology follow up note    TSERING [id number]      Interval History:      Patient occ back pain    MEDICATIONS    acetaminophen   Tablet .. 650 milliGRAM(s) Oral every 6 hours PRN  gabapentin 100 milliGRAM(s) Oral every 12 hours  heparin   Injectable 5000 Unit(s) SubCutaneous every 8 hours  lidocaine   Patch 1 Patch Transdermal every 24 hours  lisinopril 10 milliGRAM(s) Oral daily  multivitamin 1 Tablet(s) Oral daily  potassium phosphate / sodium phosphate Powder (PHOS-NaK) 1 Packet(s) Oral three times a day  sodium chloride 1 Gram(s) Oral three times a day      Allergies    No Known Allergies    Intolerances            Vital Signs Last 24 Hrs  T(C): 36.8 (2020 05:07), Max: 37 (2020 20:07)  T(F): 98.3 (2020 05:07), Max: 98.6 (2020 20:07)  HR: 68 (2020 05:07) (68 - 78)  BP: 175/67 (2020 05:07) (157/66 - 185/79)  BP(mean): --  RR: 18 (2020 05:07) (16 - 18)  SpO2: 92% (2020 05:07) (92% - 95%)        REVIEW OF SYSTEMS:  Constitutional:  No fever, chills, or night sweats.  Head:  No headache.  Eyes:  No double vision or blurry vision.  Ears:  No ringing in the ears.  Neck:  No neck pain.  Respiratory:  No shortness of breath.  Cardiovascular:  No chest pain.  Abdomen:  No nausea, vomiting, or abdominal pain.  Extremities/Neurological:  No numbness or tingling.  Musculoskeletal:  No joint pain.  General:  Positive history of back pain, which appeared to have resolved.  Positive history of upset stomach.    PHYSICAL EXAMINATION:    HEENT:  Head:  Normocephalic, atraumatic.  Eyes:  No scleral icterus.  Ears:  Hearing bilaterally intact.  NECK:  Supple.  RESPIRATORY:  Good air entry bilaterally.  CARDIOVASCULAR:  S1 and S2 heard.  ABDOMEN:  Soft and nontender.  EXTREMITIES:  No clubbing or cyanosis were noted.      NEUROLOGIC:  The patient awake, alert, and oriented x3.  Extraocular movements were intact.  Pupils were equal, round, and reactive bilaterally 3 mm to 2 mm.  Speech was fluent.  Smile symmetric.  Motor:  Bilateral upper and lower 5/5.  Sensory:  Bilateral upper and lower were intact to light touch.  No drift.  Finger-to-nose within normal limits.  Spine; upon palpation of the spine, no significant point tenderness was elicited.           LABS:  CBC Full  -  ( 2020 09:03 )  WBC Count : 7.03 K/uL  RBC Count : 3.98 M/uL  Hemoglobin : 12.6 g/dL  Hematocrit : 35.6 %  Platelet Count - Automated : 351 K/uL  Mean Cell Volume : 89.4 fl  Mean Cell Hemoglobin : 31.7 pg  Mean Cell Hemoglobin Concentration : 35.4 gm/dL  Auto Neutrophil # : x  Auto Lymphocyte # : x  Auto Monocyte # : x  Auto Eosinophil # : x  Auto Basophil # : x  Auto Neutrophil % : x  Auto Lymphocyte % : x  Auto Monocyte % : x  Auto Eosinophil % : x  Auto Basophil % : x    Urinalysis Basic - ( 2020 08:46 )    Color: Pale Yellow / Appearance: Clear / S.010 / pH: x  Gluc: x / Ketone: Moderate  / Bili: Negative / Urobili: Negative   Blood: x / Protein: Negative / Nitrite: Negative   Leuk Esterase: Trace / RBC: 3-5 /HPF / WBC 0-2   Sq Epi: x / Non Sq Epi: Occasional / Bacteria: Occasional      -    136  |  102  |  8   ----------------------------<  58<L>  4.1   |  25  |  0.53    Ca    8.3<L>      2020 09:03    TPro  6.2  /  Alb  2.6<L>  /  TBili  0.7  /  DBili  x   /  AST  26  /  ALT  19  /  AlkPhos  63  06-28    Hemoglobin A1C:     LIVER FUNCTIONS - ( 2020 09:03 )  Alb: 2.6 g/dL / Pro: 6.2 g/dL / ALK PHOS: 63 U/L / ALT: 19 U/L / AST: 26 U/L / GGT: x           Vitamin B12         RADIOLOGY      ANALYSIS AND PLAN:  This is a 79-year-old with lower back pain.  1.	For lumbago, I suspect most likely lumbar radiculopathy, overall appeared to be doing better.  I would recommend to limit the use of pain medications, it appears that it upsets her stomach.  We will try Lidoderm patch and will  try nonsteroidal and steroids taper at present on gabapentin still with pain   2.	ct abd t12 fx mri noted  3.	Physical Therapy evaluation.  4.	Fall precautions.  5.	Hypertension, continue the patient on blood pressure medication.  6.	Greater than 40 minutes of time was spent with the patient, plan of care, reviewing data, and speaking to the multidisciplinary healthcare team.

## 2020-06-29 NOTE — PROGRESS NOTE ADULT - SUBJECTIVE AND OBJECTIVE BOX
TSERING DUMONT  79y  Female    Patient is a 79y old  Female who presents with a chief complaint of back pain (2020 07:39)      feels better      PAST MEDICAL & SURGICAL HISTORY:  Back pain  S/P dilatation and curettage  Hypertension          PHYSICAL EXAM:    T(C): 36.8 (20 @ 05:07), Max: 37 (20 @ 20:07)  HR: 68 (20 @ 05:07) (68 - 78)  BP: 175/67 (20 @ 05:07) (157/66 - 185/79)  RR: 18 (20 @ 05:07) (16 - 18)  SpO2: 92% (20 @ 05:07) (92% - 95%)  Wt(kg): --    I&O's Detail      Respiratory: clear anteriorly, decreased BS at bases  Cardiovascular: S1 S2  Gastrointestinal: soft NT ND +BS  Extremities: edema   Neuro: Awake and alert    MEDICATIONS  (STANDING):  gabapentin 100 milliGRAM(s) Oral every 12 hours  heparin   Injectable 5000 Unit(s) SubCutaneous every 8 hours  lidocaine   Patch 1 Patch Transdermal every 24 hours  lisinopril 10 milliGRAM(s) Oral daily  multivitamin 1 Tablet(s) Oral daily  potassium phosphate / sodium phosphate Powder (PHOS-NaK) 1 Packet(s) Oral three times a day  sodium chloride 1 Gram(s) Oral three times a day    MEDICATIONS  (PRN):  acetaminophen   Tablet .. 650 milliGRAM(s) Oral every 6 hours PRN Mild Pain (1 - 3)                            12.6   7.03  )-----------( 351      ( 2020 09:03 )             35.6       06-    136  |  102  |  8   ----------------------------<  58<L>  4.1   |  25  |  0.53    Ca    8.3<L>      2020 09:03    TPro  6.2  /  Alb  2.6<L>  /  TBili  0.7  /  DBili  x   /  AST  26  /  ALT  19  /  AlkPhos  63  06-      Creatinine Trend: Creatinine Trend: 0.53<--, 0.61<--    Urinalysis Basic - ( 2020 08:46 )    Color: Pale Yellow / Appearance: Clear / S.010 / pH: x  Gluc: x / Ketone: Moderate  / Bili: Negative / Urobili: Negative   Blood: x / Protein: Negative / Nitrite: Negative   Leuk Esterase: Trace / RBC: 3-5 /HPF / WBC 0-2   Sq Epi: x / Non Sq Epi: Occasional / Bacteria: Occasional

## 2020-06-29 NOTE — PROGRESS NOTE ADULT - SUBJECTIVE AND OBJECTIVE BOX
Patient is a 79y old  Female who presents with a chief complaint of back pain (28 Jun 2020 14:28)      INTERVAL /OVERNIGHT EVENTS: feels better    MEDICATIONS  (STANDING):  dexAMETHasone     Tablet 2 milliGRAM(s) Oral two times a day  gabapentin 100 milliGRAM(s) Oral every 12 hours  heparin   Injectable 5000 Unit(s) SubCutaneous every 8 hours  lidocaine   Patch 1 Patch Transdermal every 24 hours  lisinopril 10 milliGRAM(s) Oral daily  multivitamin 1 Tablet(s) Oral daily  potassium phosphate / sodium phosphate Powder (PHOS-NaK) 1 Packet(s) Oral three times a day  sodium chloride 1 Gram(s) Oral three times a day    MEDICATIONS  (PRN):  acetaminophen   Tablet .. 650 milliGRAM(s) Oral every 6 hours PRN Mild Pain (1 - 3)  ibuprofen  Tablet. 200 milliGRAM(s) Oral three times a day PRN Moderate Pain (4 - 6)      T(C): 36.8 (06-29-20 @ 05:07), Max: 36.8 (06-29-20 @ 05:07)  HR: 68 (06-29-20 @ 05:07) (68 - 68)  BP: 175/67 (06-29-20 @ 05:07) (175/67 - 175/67)  RR: 18 (06-29-20 @ 05:07) (18 - 18)  SpO2: 92% (06-29-20 @ 05:07) (92% - 92%)Allergies    No Known Allergies    Intolerances        REVIEW OF SYSTEMS:  CONSTITUTIONAL: No fever, weight loss, or fatigue  EYES: No eye pain, visual disturbances, or discharge  ENMT:  No difficulty hearing, tinnitus, vertigo; No sinus or throat pain  NECK: No pain or stiffness  RESPIRATORY: No cough, wheezing, chills or hemoptysis; No shortness of breath  CARDIOVASCULAR: No chest pain, palpitations, dizziness, or leg swelling  GASTROINTESTINAL: No abdominal or epigastric pain. No nausea, vomiting, or hematemesis; No diarrhea or constipation. No melena or hematochezia.  GENITOURINARY: No dysuria, frequency, hematuria, or incontinence  NEUROLOGICAL: No headaches, memory loss, loss of strength, numbness, or tremors  SKIN: No itching, burning, rashes, or lesions   LYMPH NODES: No enlarged glands  ENDOCRINE: No heat or cold intolerance; No hair loss; No polydipsia or polyuria  MUSCULOSKELETAL: + joint pain or swelling; , +back pain  PSYCHIATRIC: No depression, anxiety, mood swings, or difficulty sleeping  HEME/LYMPH: No easy bruising, or bleeding gums  ALLERGY AND IMMUNOLOGIC: No hives or eczema    PHYSICAL EXAM:  GENERAL: NAD, well-groomed, well-developed  HEAD:  Atraumatic, Normocephalic  NECK: Supple, No JVD, Normal thyroid  NERVOUS SYSTEM:  Alert & Oriented X3, Good concentration; Motor Strength 5/5 B/L upper and lower extremities; DTRs 2+ intact and symmetric  CHEST/LUNG: Clear to auscultation bilaterally; No rales, rhonchi, wheezing, or rubs  HEART: Regular rate and rhythm; No murmurs, rubs, or gallops  ABDOMEN: Soft, Nontender, Nondistended; Bowel sounds present  EXTREMITIES:  2+ Peripheral Pulses, No clubbing, cyanosis, or edema  LYMPH: No lymphadenopathy noted  SKIN: No rashes or lesions                          12.6   7.03  )-----------( 351      ( 28 Jun 2020 09:03 )             35.6           LIVER FUNCTIONS - ( 28 Jun 2020 09:03 )  Alb: 2.6 g/dL / Pro: 6.2 g/dL / ALK PHOS: 63 U/L / ALT: 19 U/L / AST: 26 U/L / GGT: x             CAPILLARY BLOOD GLUCOSE          RADIOLOGY & ADDITIONAL TESTS:    Notes Reviewed:  [x ] YES  [ ] NO    Care Discussed with Consultants/Other Providers [x ] YES  [ ] NO

## 2020-06-29 NOTE — GOALS OF CARE CONVERSATION - ADVANCED CARE PLANNING - CONVERSATION DETAILS
met pt has hcp, Pt spouse is hcp, unable to provide at this time: Robert, spouse is hcp, unsure who is alt hcp. pt will relook at form and given copy to her children who is alt hcp. pt to discuss her resuscitation wishes with family. no directives in place at this time PC RN contact # given

## 2020-06-29 NOTE — PROGRESS NOTE ADULT - ASSESSMENT
79 white female with a history of HTN now admitted with possible fracture of the vertebra and also found to have hyponatremia. Hyponatremia secondary to pre renal azotemia vs SIADH due to ACEI. Will continue the salt tablets and place her on a FR of 1000 cc a day. Can discharge home and no need to continue the salt tablets.

## 2020-06-30 ENCOUNTER — TRANSCRIPTION ENCOUNTER (OUTPATIENT)
Age: 80
End: 2020-06-30

## 2020-06-30 VITALS
SYSTOLIC BLOOD PRESSURE: 163 MMHG | TEMPERATURE: 98 F | RESPIRATION RATE: 18 BRPM | DIASTOLIC BLOOD PRESSURE: 78 MMHG | OXYGEN SATURATION: 96 % | HEART RATE: 67 BPM

## 2020-06-30 LAB
ALBUMIN SERPL ELPH-MCNC: 3.2 G/DL — LOW (ref 3.3–5)
ALP SERPL-CCNC: 82 U/L — SIGNIFICANT CHANGE UP (ref 40–120)
ALT FLD-CCNC: 32 U/L — SIGNIFICANT CHANGE UP (ref 12–78)
ANION GAP SERPL CALC-SCNC: 9 MMOL/L — SIGNIFICANT CHANGE UP (ref 5–17)
AST SERPL-CCNC: 35 U/L — SIGNIFICANT CHANGE UP (ref 15–37)
BASOPHILS # BLD AUTO: 0.04 K/UL — SIGNIFICANT CHANGE UP (ref 0–0.2)
BASOPHILS NFR BLD AUTO: 0.5 % — SIGNIFICANT CHANGE UP (ref 0–2)
BILIRUB SERPL-MCNC: 0.4 MG/DL — SIGNIFICANT CHANGE UP (ref 0.2–1.2)
BUN SERPL-MCNC: 14 MG/DL — SIGNIFICANT CHANGE UP (ref 7–23)
CALCIUM SERPL-MCNC: 9.1 MG/DL — SIGNIFICANT CHANGE UP (ref 8.5–10.1)
CHLORIDE SERPL-SCNC: 100 MMOL/L — SIGNIFICANT CHANGE UP (ref 96–108)
CO2 SERPL-SCNC: 25 MMOL/L — SIGNIFICANT CHANGE UP (ref 22–31)
CREAT SERPL-MCNC: 0.84 MG/DL — SIGNIFICANT CHANGE UP (ref 0.5–1.3)
EOSINOPHIL # BLD AUTO: 0.02 K/UL — SIGNIFICANT CHANGE UP (ref 0–0.5)
EOSINOPHIL NFR BLD AUTO: 0.2 % — SIGNIFICANT CHANGE UP (ref 0–6)
GLUCOSE SERPL-MCNC: 201 MG/DL — HIGH (ref 70–99)
HCT VFR BLD CALC: 39.3 % — SIGNIFICANT CHANGE UP (ref 34.5–45)
HGB BLD-MCNC: 13.8 G/DL — SIGNIFICANT CHANGE UP (ref 11.5–15.5)
IMM GRANULOCYTES NFR BLD AUTO: 0.6 % — SIGNIFICANT CHANGE UP (ref 0–1.5)
LYMPHOCYTES # BLD AUTO: 0.82 K/UL — LOW (ref 1–3.3)
LYMPHOCYTES # BLD AUTO: 9.6 % — LOW (ref 13–44)
MCHC RBC-ENTMCNC: 31.6 PG — SIGNIFICANT CHANGE UP (ref 27–34)
MCHC RBC-ENTMCNC: 35.1 GM/DL — SIGNIFICANT CHANGE UP (ref 32–36)
MCV RBC AUTO: 89.9 FL — SIGNIFICANT CHANGE UP (ref 80–100)
MONOCYTES # BLD AUTO: 0.18 K/UL — SIGNIFICANT CHANGE UP (ref 0–0.9)
MONOCYTES NFR BLD AUTO: 2.1 % — SIGNIFICANT CHANGE UP (ref 2–14)
NEUTROPHILS # BLD AUTO: 7.47 K/UL — HIGH (ref 1.8–7.4)
NEUTROPHILS NFR BLD AUTO: 87 % — HIGH (ref 43–77)
NRBC # BLD: 0 /100 WBCS — SIGNIFICANT CHANGE UP (ref 0–0)
PLATELET # BLD AUTO: 458 K/UL — HIGH (ref 150–400)
POTASSIUM SERPL-MCNC: 4.5 MMOL/L — SIGNIFICANT CHANGE UP (ref 3.5–5.3)
POTASSIUM SERPL-SCNC: 4.5 MMOL/L — SIGNIFICANT CHANGE UP (ref 3.5–5.3)
PROT SERPL-MCNC: 7.5 G/DL — SIGNIFICANT CHANGE UP (ref 6–8.3)
RBC # BLD: 4.37 M/UL — SIGNIFICANT CHANGE UP (ref 3.8–5.2)
RBC # FLD: 12.6 % — SIGNIFICANT CHANGE UP (ref 10.3–14.5)
SODIUM SERPL-SCNC: 134 MMOL/L — LOW (ref 135–145)
WBC # BLD: 8.58 K/UL — SIGNIFICANT CHANGE UP (ref 3.8–10.5)
WBC # FLD AUTO: 8.58 K/UL — SIGNIFICANT CHANGE UP (ref 3.8–10.5)

## 2020-06-30 PROCEDURE — 83036 HEMOGLOBIN GLYCOSYLATED A1C: CPT

## 2020-06-30 PROCEDURE — 96372 THER/PROPH/DIAG INJ SC/IM: CPT

## 2020-06-30 PROCEDURE — 84550 ASSAY OF BLOOD/URIC ACID: CPT

## 2020-06-30 PROCEDURE — 97116 GAIT TRAINING THERAPY: CPT

## 2020-06-30 PROCEDURE — 84295 ASSAY OF SERUM SODIUM: CPT

## 2020-06-30 PROCEDURE — 72158 MRI LUMBAR SPINE W/O & W/DYE: CPT

## 2020-06-30 PROCEDURE — 96374 THER/PROPH/DIAG INJ IV PUSH: CPT

## 2020-06-30 PROCEDURE — 71250 CT THORAX DX C-: CPT

## 2020-06-30 PROCEDURE — 83735 ASSAY OF MAGNESIUM: CPT

## 2020-06-30 PROCEDURE — 85027 COMPLETE CBC AUTOMATED: CPT

## 2020-06-30 PROCEDURE — 97162 PT EVAL MOD COMPLEX 30 MIN: CPT

## 2020-06-30 PROCEDURE — 36415 COLL VENOUS BLD VENIPUNCTURE: CPT

## 2020-06-30 PROCEDURE — 97530 THERAPEUTIC ACTIVITIES: CPT

## 2020-06-30 PROCEDURE — 84100 ASSAY OF PHOSPHORUS: CPT

## 2020-06-30 PROCEDURE — 99285 EMERGENCY DEPT VISIT HI MDM: CPT

## 2020-06-30 PROCEDURE — A9579: CPT

## 2020-06-30 PROCEDURE — 83880 ASSAY OF NATRIURETIC PEPTIDE: CPT

## 2020-06-30 PROCEDURE — 97165 OT EVAL LOW COMPLEX 30 MIN: CPT

## 2020-06-30 PROCEDURE — 81001 URINALYSIS AUTO W/SCOPE: CPT

## 2020-06-30 PROCEDURE — 97535 SELF CARE MNGMENT TRAINING: CPT

## 2020-06-30 PROCEDURE — 80053 COMPREHEN METABOLIC PANEL: CPT

## 2020-06-30 RX ORDER — GABAPENTIN 400 MG/1
1 CAPSULE ORAL
Qty: 60 | Refills: 0
Start: 2020-06-30 | End: 2020-07-29

## 2020-06-30 RX ORDER — CELECOXIB 200 MG/1
200 CAPSULE ORAL DAILY
Refills: 0 | Status: DISCONTINUED | OUTPATIENT
Start: 2020-06-30 | End: 2020-06-30

## 2020-06-30 RX ORDER — ACETAMINOPHEN 500 MG
975 TABLET ORAL EVERY 6 HOURS
Refills: 0 | Status: DISCONTINUED | OUTPATIENT
Start: 2020-06-30 | End: 2020-06-30

## 2020-06-30 RX ORDER — POLYETHYLENE GLYCOL 3350 17 G/17G
17 POWDER, FOR SOLUTION ORAL
Qty: 255 | Refills: 0
Start: 2020-06-30 | End: 2020-07-14

## 2020-06-30 RX ORDER — DEXAMETHASONE 0.5 MG/5ML
1 ELIXIR ORAL
Qty: 14 | Refills: 0
Start: 2020-06-30 | End: 2020-07-06

## 2020-06-30 RX ORDER — CELECOXIB 200 MG/1
1 CAPSULE ORAL
Qty: 0 | Refills: 0 | DISCHARGE
Start: 2020-06-30

## 2020-06-30 RX ORDER — CELECOXIB 200 MG/1
1 CAPSULE ORAL
Qty: 15 | Refills: 0
Start: 2020-06-30 | End: 2020-07-14

## 2020-06-30 RX ADMIN — Medication 1 PACKET(S): at 06:09

## 2020-06-30 RX ADMIN — POLYETHYLENE GLYCOL 3350 17 GRAM(S): 17 POWDER, FOR SOLUTION ORAL at 11:17

## 2020-06-30 RX ADMIN — Medication 2 MILLIGRAM(S): at 06:09

## 2020-06-30 RX ADMIN — CELECOXIB 200 MILLIGRAM(S): 200 CAPSULE ORAL at 14:02

## 2020-06-30 RX ADMIN — Medication 1 TABLET(S): at 11:17

## 2020-06-30 RX ADMIN — LISINOPRIL 10 MILLIGRAM(S): 2.5 TABLET ORAL at 06:08

## 2020-06-30 RX ADMIN — GABAPENTIN 100 MILLIGRAM(S): 400 CAPSULE ORAL at 06:08

## 2020-06-30 RX ADMIN — HEPARIN SODIUM 5000 UNIT(S): 5000 INJECTION INTRAVENOUS; SUBCUTANEOUS at 06:09

## 2020-06-30 NOTE — PROGRESS NOTE ADULT - PROBLEM SELECTOR PLAN 2
likely from SIADH, pre renal azotemia- resolving.   fluid restriction  Renal following.
likely from SIADH, pre renal azotemia- resolving.   fluid restriction  Renal following.
serial BMP  fluid restriction  renal eval
Renal fu

## 2020-06-30 NOTE — PROGRESS NOTE ADULT - ASSESSMENT
79 white female with a history of HTN now admitted with possible fracture of the vertebra and also found to have hyponatremia. Hyponatremia secondary to pre renal azotemia vs SIADH due to ACEI. Will continue the salt tablets and place her on a FR of 1000 cc a day. Can discharge home and will stop the salt tablets now.

## 2020-06-30 NOTE — CONSULT NOTE ADULT - SUBJECTIVE AND OBJECTIVE BOX
Date/Time Patient Seen:  		  Referring MD:   Data Reviewed	       Patient is a 79y old  Female who presents with a chief complaint of back pain (30 Jun 2020 09:20)      Subjective/HPI  in chair  seen and examined  vs and meds reviewed  labs reviewed  H and P reviewed  PT notes reviewed  alert  verbal  non smoker  non drinker  lives at home      · Chief Complaint: The patient is a 79y Female complaining of see chief complaint quote.  · HPI Objective Statement: 78 yo female with h/o HTN BIBA transferred from [place] for back pain with T12 compression deformity and hyponatremia. Patient with lower back pain for several days. No fall or trauma. PAtient was seen in ED at [place] on June 23rd had CT renal hunt and labs, + hyponatremia. PAtient was dced home with tramdol and lidocaine. At [place] today patient had labs and ct abd/pelvis, + T12 compression fx, constipation, + hyponatremia 127.  Patient states the tramadol males her feel sick and constipated. Denies fever, chills, chest pain, sob, abd pain, N/V, urinary sxs. no UE/LE weakness or paresthesias, no saddle anesthesia, no bowel or bladder incontinence/retention, no prior back surgery      · Chief Complaint: The patient is a 79y Female complaining of see chief complaint quote.  · HPI Objective Statement: 78 yo female with h/o HTN BIBA transferred from [place] for back pain with T12 compression deformity and hyponatremia. Patient with lower back pain for several days. No fall or trauma. PAtient was seen in ED at [place] on June 23rd had CT renal hunt and labs, + hyponatremia. PAtient was dced home with tramdol and lidocaine. At [place] today patient had labs and ct abd/pelvis, + T12 compression fx, constipation, + hyponatremia 127.  Patient states the tramadol males her feel sick and constipated. Denies fever, chills, chest pain, sob, abd pain, N/V, urinary sxs. no UE/LE weakness or paresthesias, no saddle anesthesia, no bowel or bladder incontinence/retention, no prior back surgery  patient is being admitted for further work up and treatment    met pt has hcp, Pt spouse is hcp, unable to provide at this time: Robert, spouse is hcp, unsure who is alt hcp. pt will relook at form and given copy to her children who is alt hcp. pt to discuss her resuscitation wishes with family. no directives in place at this time PC RN contact # given   PAST MEDICAL & SURGICAL HISTORY:  Back pain  S/P dilatation and curettage  Hypertension        Medication list         MEDICATIONS  (STANDING):  dexAMETHasone     Tablet 2 milliGRAM(s) Oral two times a day  gabapentin 100 milliGRAM(s) Oral every 12 hours  heparin   Injectable 5000 Unit(s) SubCutaneous every 8 hours  lidocaine   Patch 1 Patch Transdermal every 24 hours  lisinopril 10 milliGRAM(s) Oral daily  multivitamin 1 Tablet(s) Oral daily  polyethylene glycol 3350 17 Gram(s) Oral daily  potassium phosphate / sodium phosphate Powder (PHOS-NaK) 1 Packet(s) Oral three times a day  senna 2 Tablet(s) Oral at bedtime  sodium chloride 1 Gram(s) Oral three times a day    MEDICATIONS  (PRN):  acetaminophen   Tablet .. 650 milliGRAM(s) Oral every 6 hours PRN Mild Pain (1 - 3)  bisacodyl Suppository 10 milliGRAM(s) Rectal daily PRN Constipation  ibuprofen  Tablet. 200 milliGRAM(s) Oral three times a day PRN Moderate Pain (4 - 6)         Vitals log        ICU Vital Signs Last 24 Hrs  T(C): 36.6 (30 Jun 2020 05:13), Max: 36.6 (30 Jun 2020 05:13)  T(F): 97.8 (30 Jun 2020 05:13), Max: 97.8 (30 Jun 2020 05:13)  HR: 58 (30 Jun 2020 05:13) (58 - 85)  BP: 166/78 (30 Jun 2020 05:13) (166/64 - 167/78)  BP(mean): --  ABP: --  ABP(mean): --  RR: 18 (30 Jun 2020 05:13) (16 - 18)  SpO2: 94% (30 Jun 2020 05:13) (93% - 97%)           Input and Output:  I&O's Detail      Lab Data                        13.8   8.58  )-----------( 458      ( 30 Jun 2020 09:30 )             39.3     06-30    134<L>  |  100  |  14  ----------------------------<  201<H>  4.5   |  25  |  0.84    Ca    9.1      30 Jun 2020 09:30    TPro  7.5  /  Alb  3.2<L>  /  TBili  0.4  /  DBili  x   /  AST  35  /  ALT  32  /  AlkPhos  82  06-30            Review of Systems	      Objective     Physical Examination        Pertinent Lab findings & Imaging      Dieudonne:  NO   Adequate UO     I&O's Detail           Discussed with:     Cultures:	        Radiology

## 2020-06-30 NOTE — DISCHARGE NOTE NURSING/CASE MANAGEMENT/SOCIAL WORK - PATIENT PORTAL LINK FT
You can access the FollowMyHealth Patient Portal offered by [facility] by registering at the following website: [email or web address]. By joining [employer]’s FollowMyHealth portal, you will also be able to view your health information using other applications (apps) compatible with our system.

## 2020-06-30 NOTE — PROGRESS NOTE ADULT - SUBJECTIVE AND OBJECTIVE BOX
Neurology follow up note    TSERING [id number]      Interval History:    Patient feels ok n that her back pain is better     MEDICATIONS    acetaminophen   Tablet .. 650 milliGRAM(s) Oral every 6 hours PRN  bisacodyl Suppository 10 milliGRAM(s) Rectal daily PRN  dexAMETHasone     Tablet 2 milliGRAM(s) Oral two times a day  gabapentin 100 milliGRAM(s) Oral every 12 hours  heparin   Injectable 5000 Unit(s) SubCutaneous every 8 hours  ibuprofen  Tablet. 200 milliGRAM(s) Oral three times a day PRN  lidocaine   Patch 1 Patch Transdermal every 24 hours  lisinopril 10 milliGRAM(s) Oral daily  multivitamin 1 Tablet(s) Oral daily  polyethylene glycol 3350 17 Gram(s) Oral daily  potassium phosphate / sodium phosphate Powder (PHOS-NaK) 1 Packet(s) Oral three times a day  senna 2 Tablet(s) Oral at bedtime  sodium chloride 1 Gram(s) Oral three times a day      Allergies    No Known Allergies    Intolerances            Vital Signs Last 24 Hrs  T(C): 36.6 (30 Jun 2020 05:13), Max: 36.6 (30 Jun 2020 05:13)  T(F): 97.8 (30 Jun 2020 05:13), Max: 97.8 (30 Jun 2020 05:13)  HR: 58 (30 Jun 2020 05:13) (58 - 85)  BP: 166/78 (30 Jun 2020 05:13) (166/64 - 167/78)  BP(mean): --  RR: 18 (30 Jun 2020 05:13) (16 - 18)  SpO2: 94% (30 Jun 2020 05:13) (93% - 97%)      REVIEW OF SYSTEMS:  Constitutional:  No fever, chills, or night sweats.  Head:  No headache.  Eyes:  No double vision or blurry vision.  Ears:  No ringing in the ears.  Neck:  No neck pain.  Respiratory:  No shortness of breath.  Cardiovascular:  No chest pain.  Abdomen:  No nausea, vomiting, or abdominal pain.  Extremities/Neurological:  No numbness or tingling.  Musculoskeletal:  No joint pain.  General:  less back pain, which appeared to have resolved.  Positive history of upset stomach.    PHYSICAL EXAMINATION:    HEENT:  Head:  Normocephalic, atraumatic.  Eyes:  No scleral icterus.  Ears:  Hearing bilaterally intact.  NECK:  Supple.  RESPIRATORY:  Good air entry bilaterally.  CARDIOVASCULAR:  S1 and S2 heard.  ABDOMEN:  Soft and nontender.  EXTREMITIES:  No clubbing or cyanosis were noted.      NEUROLOGIC:  The patient awake, alert, and oriented x3.  Extraocular movements were intact.  Pupils were equal, round, and reactive bilaterally 3 mm to 2 mm.  Speech was fluent.  Smile symmetric.  Motor:  Bilateral upper and lower 5/5.  Sensory:  Bilateral upper and lower were intact to light touch.  No drift.  Finger-to-nose within normal limits.  Spine; upon palpation of the spine, no significant point tenderness was elicited.              LABS:            Hemoglobin A1C:       Vitamin B12         RADIOLOGY    ANALYSIS AND PLAN:  This is a 79-year-old with lower back pain.  1.	For lumbago, I suspect most likely lumbar radiculopathy, overall appeared to be doing better.  I would recommend to limit the use of pain medications, it appears that it upsets her stomach.  We will try Lidoderm patch and will  try nonsteroidal and steroids taper at present on gabapentin overall  pain at present as per patient is better   2.	ct abd t12 fx mri noted  3.	Physical Therapy evaluation.  4.	Fall precautions.  5.	Hypertension, continue the patient on blood pressure medication.  6.	overall back pain is better --- recommend to taper off steroids in one week   7.	Greater than 40 minutes of time was spent with the patient, plan of care, reviewing data, and speaking to the multidisciplinary healthcare team.

## 2020-06-30 NOTE — PROGRESS NOTE ADULT - ASSESSMENT
Pt with intractable low back pain due to compression fx.   Much improved on Gabapentin.   Needs PT  Can discharge pt to fu with me in office.     Hyponatremia likely chronic. Renal note apprec.   Bronchiectasis chronic.

## 2020-06-30 NOTE — CONSULT NOTE ADULT - PROBLEM SELECTOR RECOMMENDATION 9
back pain - OP - OA - VCF -   PT training - Gait training  fall prec  pain rx regimen - ACAP - Lidoderm - low dose Batista 2 PRN   bowel regimen  pulm eval noted - ct chest reviewed  assist with ADL  GOC documented  dc planned for Home today  on steroid taper as per Neuro
pain control  MRI

## 2020-06-30 NOTE — PROGRESS NOTE ADULT - SUBJECTIVE AND OBJECTIVE BOX
Patient is a 79y old  Female who presents with a chief complaint of back pain (28 Jun 2020 14:28)      INTERVAL /OVERNIGHT EVENTS: feels better  Discharge summary 06/30/2020    MEDICATIONS  (STANDING):  dexAMETHasone     Tablet 2 milliGRAM(s) Oral two times a day  gabapentin 100 milliGRAM(s) Oral every 12 hours  heparin   Injectable 5000 Unit(s) SubCutaneous every 8 hours  lidocaine   Patch 1 Patch Transdermal every 24 hours  lisinopril 10 milliGRAM(s) Oral daily  multivitamin 1 Tablet(s) Oral daily  potassium phosphate / sodium phosphate Powder (PHOS-NaK) 1 Packet(s) Oral three times a day  sodium chloride 1 Gram(s) Oral three times a day    MEDICATIONS  (PRN):  acetaminophen   Tablet .. 650 milliGRAM(s) Oral every 6 hours PRN Mild Pain (1 - 3)  ibuprofen  Tablet. 200 milliGRAM(s) Oral three times a day PRN Moderate Pain (4 - 6)      T(C): 36.8 (06-29-20 @ 05:07), Max: 36.8 (06-29-20 @ 05:07)  HR: 68 (06-29-20 @ 05:07) (68 - 68)  BP: 175/67 (06-29-20 @ 05:07) (175/67 - 175/67)  RR: 18 (06-29-20 @ 05:07) (18 - 18)  SpO2: 92% (06-29-20 @ 05:07) (92% - 92%)Allergies    No Known Allergies    Intolerances        REVIEW OF SYSTEMS:  CONSTITUTIONAL: No fever, weight loss, or fatigue  EYES: No eye pain, visual disturbances, or discharge  ENMT:  No difficulty hearing, tinnitus, vertigo; No sinus or throat pain  NECK: No pain or stiffness  RESPIRATORY: No cough, wheezing, chills or hemoptysis; No shortness of breath  CARDIOVASCULAR: No chest pain, palpitations, dizziness, or leg swelling  GASTROINTESTINAL: No abdominal or epigastric pain. No nausea, vomiting, or hematemesis; No diarrhea or constipation. No melena or hematochezia.  GENITOURINARY: No dysuria, frequency, hematuria, or incontinence  NEUROLOGICAL: No headaches, memory loss, loss of strength, numbness, or tremors  SKIN: No itching, burning, rashes, or lesions   LYMPH NODES: No enlarged glands  ENDOCRINE: No heat or cold intolerance; No hair loss; No polydipsia or polyuria  MUSCULOSKELETAL: + joint pain or swelling; , +back pain  PSYCHIATRIC: No depression, anxiety, mood swings, or difficulty sleeping  HEME/LYMPH: No easy bruising, or bleeding gums  ALLERGY AND IMMUNOLOGIC: No hives or eczema    PHYSICAL EXAM:  GENERAL: NAD, well-groomed, well-developed  HEAD:  Atraumatic, Normocephalic  NECK: Supple, No JVD, Normal thyroid  NERVOUS SYSTEM:  Alert & Oriented X3, Good concentration; Motor Strength 5/5 B/L upper and lower extremities; DTRs 2+ intact and symmetric  CHEST/LUNG: Clear to auscultation bilaterally; No rales, rhonchi, wheezing, or rubs  HEART: Regular rate and rhythm; No murmurs, rubs, or gallops  ABDOMEN: Soft, Nontender, Nondistended; Bowel sounds present  EXTREMITIES:  2+ Peripheral Pulses, No clubbing, cyanosis, or edema  LYMPH: No lymphadenopathy noted  SKIN: No rashes or lesions                                            13.8   8.58  )-----------( 458      ( 30 Jun 2020 09:30 )             39.3           LIVER FUNCTIONS - ( 30 Jun 2020 09:30 )  Alb: 3.2 g/dL / Pro: 7.5 g/dL / ALK PHOS: 82 U/L / ALT: 32 U/L / AST: 35 U/L / GGT: x               CAPILLARY BLOOD GLUCOSE          RADIOLOGY & ADDITIONAL TESTS:    Notes Reviewed:  [x ] YES  [ ] NO    Care Discussed with Consultants/Other Providers [x ] YES  [ ] NO

## 2020-06-30 NOTE — PROGRESS NOTE ADULT - SUBJECTIVE AND OBJECTIVE BOX
TSERING DUMONT  79y  Female    Patient is a 79y old  Female who presents with a chief complaint of back pain (30 Jun 2020 09:20)    feels much better      PAST MEDICAL & SURGICAL HISTORY:  Back pain  S/P dilatation and curettage  Hypertension          PHYSICAL EXAM:    T(C): 36.6 (06-30-20 @ 05:13), Max: 36.6 (06-30-20 @ 05:13)  HR: 58 (06-30-20 @ 05:13) (58 - 85)  BP: 166/78 (06-30-20 @ 05:13) (166/64 - 167/78)  RR: 18 (06-30-20 @ 05:13) (16 - 18)  SpO2: 94% (06-30-20 @ 05:13) (93% - 97%)  Wt(kg): --    I&O's Detail      Respiratory: clear anteriorly, decreased BS at bases  Cardiovascular: S1 S2  Gastrointestinal: soft NT ND +BS  Extremities: edema   Neuro: Awake and alert    MEDICATIONS  (STANDING):  dexAMETHasone     Tablet 2 milliGRAM(s) Oral two times a day  gabapentin 100 milliGRAM(s) Oral every 12 hours  heparin   Injectable 5000 Unit(s) SubCutaneous every 8 hours  lidocaine   Patch 1 Patch Transdermal every 24 hours  lisinopril 10 milliGRAM(s) Oral daily  multivitamin 1 Tablet(s) Oral daily  polyethylene glycol 3350 17 Gram(s) Oral daily  potassium phosphate / sodium phosphate Powder (PHOS-NaK) 1 Packet(s) Oral three times a day  senna 2 Tablet(s) Oral at bedtime  sodium chloride 1 Gram(s) Oral three times a day    MEDICATIONS  (PRN):  acetaminophen   Tablet .. 650 milliGRAM(s) Oral every 6 hours PRN Mild Pain (1 - 3)  bisacodyl Suppository 10 milliGRAM(s) Rectal daily PRN Constipation  ibuprofen  Tablet. 200 milliGRAM(s) Oral three times a day PRN Moderate Pain (4 - 6)                            13.8   8.58  )-----------( 458      ( 30 Jun 2020 09:30 )             39.3       06-30    134<L>  |  100  |  14  ----------------------------<  201<H>  4.5   |  25  |  0.84    Ca    9.1      30 Jun 2020 09:30    TPro  7.5  /  Alb  3.2<L>  /  TBili  0.4  /  DBili  x   /  AST  35  /  ALT  32  /  AlkPhos  82  06-30      Creatinine Trend: Creatinine Trend: 0.84<--, 0.53<--, 0.61<--

## 2020-06-30 NOTE — PROGRESS NOTE ADULT - SUBJECTIVE AND OBJECTIVE BOX
PULMONARY/CRITICAL CARE    Much less  back pain. Started to ambulate with PT. No sob,cough.     Patient is a 79y old  Female who presents with a chief complaint of severe low back pain.  BRIEF HOSPITAL COURSE: ***  · Chief Complaint: The patient is a 79y Female complaining of see chief complaint quote.  · HPI Objective Statement: 78 yo female with h/o HTN BIBA transferred from [facility] for back pain with T12 compression deformity and hyponatremia. Patient with lower back pain for several days. No fall or trauma. PAtient was seen in ED at [facility] on June 23rd had CT renal hunt and labs, + hyponatremia. PAtient was dced home with tramdol and lidocaine. At [facility] today patient had labs and ct abd/pelvis, + T12 compression fx, constipation, + hyponatremia 127.  Patient states the tramadol males her feel sick and constipated. Denies fever, chills, chest pain, sob, abd pain, N/V, urinary sxs. no UE/LE weakness or paresthesias, no saddle anesthesia, no bowel or bladder incontinence/retention, no prior back surgery  Pt has had hyponatremia for unknown period of time.  Hx Bronchiectasis with Lady [place] syndrome., likely chronic KAILEY lung infection (but never treated with antibiotics)  Events last 24 hours: ***    PAST MEDICAL & SURGICAL HISTORY:  Back pain  S/P dilatation and curettage  Hypertension    Allergies    No Known Allergies    Intolerances      FAMILY HISTORY/social  No cigs, etoh        Medications:        acetaminophen   Tablet .. 650 milliGRAM(s) Oral every 6 hours PRN  traMADol 25 milliGRAM(s) Oral four times a day PRN      heparin   Injectable 5000 Unit(s) SubCutaneous every 12 hours          multivitamin 1 Tablet(s) Oral daily      lidocaine   Patch 1 Patch Transdermal every 24 hours            ICU Vital Signs Last 24 Hrs  T(C): 36.7 (27 Jun 2020 08:57), Max: 36.7 (27 Jun 2020 08:57)  T(F): 98 (27 Jun 2020 08:57), Max: 98 (27 Jun 2020 08:57)  HR: 68 (27 Jun 2020 08:57) (68 - 68)  BP: 160/72 (27 Jun 2020 08:57) (160/72 - 160/72)  BP(mean): --  ABP: --  ABP(mean): --  RR: 18 (27 Jun 2020 08:57) (18 - 18)  SpO2: 97% (27 Jun 2020 08:57) (97% - 97%)    Vital Signs Last 24 Hrs  T(C): 36.7 (27 Jun 2020 08:57), Max: 36.7 (27 Jun 2020 08:57)  T(F): 98 (27 Jun 2020 08:57), Max: 98 (27 Jun 2020 08:57)  HR: 68 (27 Jun 2020 08:57) (68 - 68)  BP: 160/72 (27 Jun 2020 08:57) (160/72 - 160/72)  BP(mean): --  RR: 18 (27 Jun 2020 08:57) (18 - 18)  SpO2: 97% (27 Jun 2020 08:57) (97% - 97%)        I&O's Detail        LABS:    06-27    126<L>  |  x   |  x   ----------------------------<  x   x    |  x   |  x               CAPILLARY BLOOD GLUCOSE            CULTURES:      Physical Examination:    General: elderly thin female nad     HEENT: Pupils equal, reactive to light.  Symmetric.    PULM: Clear to auscultation bilaterally, no significant sputum production    CVS: Regular rate and rhythm, no murmurs, rubs, or gallops    ABD: Soft, nondistended, nontender, normoactive bowel sounds, no masses    EXT: No edema, nontender spasm low back.     SKIN: Warm and well perfused, no rashes noted.    NEURO: Alert, oriented, interactive, nonfocal    RADIOLOGY: ***< from: CT Chest No Cont (06.29.20 @ 10:15) >  EXAM:  CT CHEST                            PROCEDURE DATE:  06/29/2020          INTERPRETATION:  CLINICAL INFORMATION: Bronchiectasis    COMPARISON: None.    PROCEDURE:   CT of the Chest was performed without intravenous contrast.  Sagittal and coronal reformats were performed.    FINDINGS:    LUNGS AND AIRWAYS: Patent central airways.  Mild bibasilar bronchial thickening and traction bronchiectasis in the right middle lobe and the bilateral lower lobes. Scattered bilateral tree-in-bud parenchymal opacities consistent with an inflammatory or infectious bronchiolitis.  PLEURA: Moderate bilateral layering pleural effusions left larger than right.  MEDIASTINUM AND TEODORO: No lymphadenopathy. Small hiatal hernia.  VESSELS: Nonaneurysmal.  HEART: Heart size is normal. No pericardial effusion.  CHEST WALL AND LOWER NECK: Within normal limits.  VISUALIZED UPPER ABDOMEN: Within normal limits.  BONES: Degenerative changes. Profound thoracic scoliosis. Mild age-indeterminate superior endplate compression deformity of a lower thoracic vertebral body. . Please refer to recent MR lumbar spine report    IMPRESSION:   Bibasilar bronchial thickening and traction bronchiectasis.  Scattered bilateral tree-in-bud parenchymal opacities consistent with aninflammatory/infectious bronchiolitis.  Bilateral pleural effusions.        < end of copied text >  < from: MR Lumbar Spine w/wo IV Cont (06.29.20 @ 09:02) >  EXAM:  MR SPINE LUMBAR WAW IC                            PROCEDURE DATE:  06/29/2020          INTERPRETATION:  MRI lumbar spine with and without contrast    History compression fracture    Contrast Gadavist 5.5 cc; 2 cc discarded    There is mild upper endplate wedge compression of T12 with a discrete horizontal fracture line and a tiny fluid-filled cleft. There is no retropulsion or significant kyphosis. There is slight physiologic wedging of T11. Vertebral body height and marrow signal elsewhere is preserved.    There is mild endplate squaring at the otherwise normal T12-L1 and L1-L2 levels.    The L2-L3 and L3-L4 disc spaces are. There is mild ligamentous and facet hypertrophy without spinal canal or foraminal stenosis.    The L4-5 disc space is preserved. There is mild degenerative annular disc bulging and moderate ligamentous and facet hypertrophy. Together these changes result in moderate spinal canal and mild bilateral foraminal stenosis.    The L5-S1 disc space is preserved. There is a small broad-based central disc bulge without mass effect on the ventral thecal sac or spinal canal or foraminal stenosis. There is mild degenerative facet change on the right.    There is no abnormal enhancement.    IMPRESSION: Subacute appearing, likely benign senile wedge compression of T12. Underlying spondylosis resulting in spinal stenosis at L4-5.                ADRIANA ELIZONDO M.D., ATTENDING RADIOLOGIST  This document has been electronically signed. Jun 29 2020  9:20AM    < end of copied text >      CRITICAL CARE TIME SPENT: ***

## 2020-06-30 NOTE — PROGRESS NOTE ADULT - PROBLEM SELECTOR PLAN 1
pain control  neuro eval with Dr. Corcoran  PT eval
pain control with pain meds, Dexamethasone.   neuro eval with Dr. Corcoran  MRI spine shows Subacute appearing, likely benign senile wedge compression of   T12. Underlying spondylosis resulting in spinal stenosis at L4-5.
pain control with pain meds, Dexamethasone.   neuro eval with Dr. Corcoran  MRI spine shows Subacute appearing, likely benign senile wedge compression of   T12. Underlying spondylosis resulting in spinal stenosis at L4-5.  D/C planning.  Refer to discharge summary 06/30/2020
mri spine  gabapentin  PT
mri spine  gabapentin  PT
mri spine noted  gabapentin  PT

## 2020-06-30 NOTE — PROGRESS NOTE ADULT - PROBLEM SELECTOR PLAN 3
Heparin for DVT prevention
observe

## 2020-06-30 NOTE — PROGRESS NOTE ADULT - PROBLEM SELECTOR PROBLEM 3
Preventive measure
T12 compression fracture

## 2020-06-30 NOTE — PROGRESS NOTE ADULT - REASON FOR ADMISSION
back pain

## 2020-07-07 RX ORDER — DEXAMETHASONE 0.5 MG/5ML
1 ELIXIR ORAL
Qty: 7 | Refills: 0
Start: 2020-07-07 | End: 2020-07-13

## 2020-10-01 ENCOUNTER — RX CHANGE (OUTPATIENT)
Age: 80
End: 2020-10-01

## 2020-10-12 NOTE — ASU PATIENT PROFILE, ADULT - CAREGIVER
Yes Mohs Histo Method Verbiage: Each section was then chromacoded and processed in the Mohs lab using the Mohs protocol and submitted for frozen section.

## 2020-11-11 ENCOUNTER — RX CHANGE (OUTPATIENT)
Age: 80
End: 2020-11-11

## 2021-07-08 NOTE — OCCUPATIONAL THERAPY INITIAL EVALUATION ADULT - AMBULATORY DEVICES NEEDED
no
I have personally provided the amount of critical care time documented below concurrently with the resident/fellow.  This time excludes time spent on separate procedures and time spent teaching. I have reviewed the resident’s / fellow’s documentation and I agree with the history, exam, and assessment and plan of care.

## 2021-11-11 NOTE — H&P ADULT - DOES THIS PATIENT HAVE A HISTORY OF OR HAS BEEN DX WITH HEART FAILURE?
unknown Cosentyx Counseling:  I discussed with the patient the risks of Cosentyx including but not limited to worsening of Crohn's disease, immunosuppression, allergic reactions and infections.  The patient understands that monitoring is required including a PPD at baseline and must alert us or the primary physician if symptoms of infection or other concerning signs are noted.

## 2022-03-24 NOTE — ASU PATIENT PROFILE, ADULT - ARRIVAL TIME
----- Message from Delia Trujillo sent at 3/24/2022 10:37 AM CDT -----  Subject: Referral Request    QUESTIONS   Reason for referral request? PT NEEDS A REFERRAL TO SE DR UDAY HALEY THE   ORTHRO SURGEON ,PT WANTS TO SEE  FOR RIGHT HIP PAIN OFFICE CAN CALL   SPOUSE Ghada Zhong -[phone] PER PATIENT   Has the physician seen you for this condition before? Yes  Select a date? 2022-03-24  Select the Provider the patient wants to be referred to, if known (PCP or   Specialist)? Outside Physician - SUDHA   Preferred Specialist (if applicable)? Do you already have an appointment scheduled? No  Additional Information for Provider?   ---------------------------------------------------------------------------  --------------  CALL BACK INFO  What is the best way for the office to contact you? OK to leave message on   voicemail  Preferred Call Back Phone Number?  [phone] 06:45

## 2022-04-09 RX ORDER — TRAMADOL HYDROCHLORIDE 50 MG/1
0 TABLET ORAL
Qty: 0 | Refills: 0 | DISCHARGE

## 2022-04-09 RX ORDER — TRAMADOL HYDROCHLORIDE 50 MG/1
1 TABLET ORAL
Qty: 0 | Refills: 0 | DISCHARGE

## 2022-04-09 RX ORDER — BENAZEPRIL HYDROCHLORIDE 40 MG/1
1 TABLET ORAL
Qty: 0 | Refills: 0 | DISCHARGE

## 2022-10-26 NOTE — ED ADULT NURSE NOTE - SKIN TEMPERATURE MOISTURE
Impression: Other secondary cataract, bilateral: H26.493. Plan: Discussed diagnosis in detail with patient. Discussed treatment options with patient. Discussed risks and benefits and patient understands. Pt. elects YAG laser. Plan OU.   Symfony IOL OU.
1 year Complete exam c possible MAC OCT after YAG
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 Plan: PVD c Floaters OD. Discussed findings in detail. Discussed signs and symptoms of RD and RTC STAT if noticed. No retinal pathology. Pt. is taking AREDS 2 vits for strong Fhx of AMD.  Pt. has seen retina. No AMD changes noted today.
warm

## 2023-01-09 ENCOUNTER — OFFICE (OUTPATIENT)
Dept: URBAN - METROPOLITAN AREA CLINIC 102 | Facility: CLINIC | Age: 83
Setting detail: OPHTHALMOLOGY
End: 2023-01-09
Payer: MEDICARE

## 2023-01-09 DIAGNOSIS — G24.5: ICD-10-CM

## 2023-01-09 DIAGNOSIS — Z96.1: ICD-10-CM

## 2023-01-09 DIAGNOSIS — H40.013: ICD-10-CM

## 2023-01-09 DIAGNOSIS — H18.831: ICD-10-CM

## 2023-01-09 DIAGNOSIS — H16.222: ICD-10-CM

## 2023-01-09 PROCEDURE — 92133 CPTRZD OPH DX IMG PST SGM ON: CPT | Performed by: OPHTHALMOLOGY

## 2023-01-09 PROCEDURE — 92083 EXTENDED VISUAL FIELD XM: CPT | Performed by: OPHTHALMOLOGY

## 2023-01-09 PROCEDURE — 99213 OFFICE O/P EST LOW 20 MIN: CPT | Performed by: OPHTHALMOLOGY

## 2023-01-09 ASSESSMENT — VISUAL ACUITY
OD_BCVA: 20/30-1
OS_BCVA: 20/30

## 2023-01-09 ASSESSMENT — REFRACTION_CURRENTRX
OD_SPHERE: -0.50
OS_AXIS: 006
OD_AXIS: 164
OS_CYLINDER: -0.75
OS_VPRISM_DIRECTION: SV
OS_AXIS: 005
OD_OVR_VA: 20/
OD_CYLINDER: -0.50
OS_OVR_VA: 20/
OS_VPRISM_DIRECTION: SV
OS_OVR_VA: 20/
OS_SPHERE: PLANO
OD_SPHERE: -0.50
OS_CYLINDER: -0.75
OD_VPRISM_DIRECTION: SV
OD_AXIS: 169
OD_VPRISM_DIRECTION: SV
OD_CYLINDER: -0.50
OS_SPHERE: PLANO
OD_OVR_VA: 20/

## 2023-01-09 ASSESSMENT — TONOMETRY
OD_IOP_MMHG: 17
OD_IOP_MMHG: 16
OS_IOP_MMHG: 16
OS_IOP_MMHG: 15

## 2023-01-09 ASSESSMENT — AXIALLENGTH_DERIVED
OD_AL: 23.4412
OS_AL: 23.4921
OS_AL: 23.8849

## 2023-01-09 ASSESSMENT — DECREASING TEAR LAKE - SEVERITY SCORE
OD_DEC_TEARLAKE: 1+
OS_DEC_TEARLAKE: 1+

## 2023-01-09 ASSESSMENT — KERATOMETRY
OD_AXISANGLE_DEGREES: 94
OD_K1POWER_DIOPTERS: 43.00
OD_K2POWER_DIOPTERS: 43.50
OS_AXISANGLE_DEGREES: 82
OS_K1POWER_DIOPTERS: 43.00
OS_K2POWER_DIOPTERS: 43.75
METHOD_AUTO_MANUAL: AUTO

## 2023-01-09 ASSESSMENT — SPHEQUIV_DERIVED
OS_SPHEQUIV: 0.375
OS_SPHEQUIV: -0.625
OD_SPHEQUIV: 0.625

## 2023-01-09 ASSESSMENT — REFRACTION_AUTOREFRACTION
OS_SPHERE: +0.75
OS_CYLINDER: -0.75
OD_AXIS: 002
OD_SPHERE: +1.00
OS_AXIS: 148
OD_CYLINDER: -0.75

## 2023-01-09 ASSESSMENT — CONFRONTATIONAL VISUAL FIELD TEST (CVF)
OD_FINDINGS: FULL
OS_FINDINGS: FULL

## 2023-01-09 ASSESSMENT — REFRACTION_MANIFEST
OS_SPHERE: +0.25
OS_AXIS: 105
OS_CYLINDER: -1.75
OD_VA1: 20/40
OS_VA1: 20/80

## 2023-01-09 ASSESSMENT — SUPERFICIAL PUNCTATE KERATITIS (SPK): OS_SPK: T

## 2023-05-08 NOTE — H&P ADULT - MINUTES
Outpatient Palliative Care at the  [facility]: Office Visit Established Patient     Diagnosis: metastatic bladder cancer    Treatment Plan:gemzar+carboplatin--> avelumab    Treatment Intent: Palliative    Medical Oncologist: Dr. Tara Don Oncologist: N/A    Navigator: N/A    Chief Complaint:    Chief Complaint   Patient presents with    Follow-up       History of Present Illness:  Mr. Codi Dawn is a 71 y.o. male who presents today for evaluation regarding pain and symptom management and introduction to palliative medicine in the setting of metastatic bladder cancer. Mr. Codi Dawn was initially evaluated by Dr. Ruthie Landry 8/19/22 after being referred by his PCP for 6 months intermittent hematuria. Underwent cystoscopy 8/29/22 which demonstrated prostate hypertrophy and several solid papillary tumors over the trigone. CT urogram 9/7/22 demonstrated findings concerning for primary bladder malignancy causing early obstruction of the R ureter as well as pelvic lymphadenopathy. 9/14/22 pt presented to the ED with increased pain. He was admitted with CHRISTIANO and severe sepsis. CT A/P re demonstrated above findings as well as a small R pleural effusion, hyperattenuation within R iliopsoas. 9/21/22 pt underwent TURBT with Dr. Ruthie Landry. PET-CT 10/25/22 demonstrated extensive metastatic disease in pelvic LN, a metastatic nodule in R adrenal gland; negative for osseous mets. He was referred to Dr. Jitendra Michel and began Carbo/Gemzar in November 2023. PET on 2/17/23 showed significant clinical response and he started maintenance avelumab on 3/27/23. Patient lives with his wife. His sister and brother in law live next door. Patient's stepson and daughter in law are no longer involved in his care. INTERVAL HISTORY:  Patient seen in infusion. He is drowsy during visit and history is limited. Patient has been doing fair recently.   His lower pelvic pain is very well controlled on current regimen of MS Contin 15mg
See scanned exercise session telemetry
30

## 2023-07-15 ENCOUNTER — EMERGENCY (EMERGENCY)
Facility: HOSPITAL | Age: 83
LOS: 1 days | Discharge: HOME | End: 2023-07-15
Attending: EMERGENCY MEDICINE | Admitting: EMERGENCY MEDICINE
Payer: MEDICARE

## 2023-07-15 VITALS
HEART RATE: 58 BPM | RESPIRATION RATE: 18 BRPM | OXYGEN SATURATION: 97 % | SYSTOLIC BLOOD PRESSURE: 170 MMHG | DIASTOLIC BLOOD PRESSURE: 72 MMHG | TEMPERATURE: 98 F

## 2023-07-15 VITALS
SYSTOLIC BLOOD PRESSURE: 175 MMHG | HEART RATE: 68 BPM | HEIGHT: 65 IN | WEIGHT: 111.99 LBS | DIASTOLIC BLOOD PRESSURE: 65 MMHG | TEMPERATURE: 98 F | OXYGEN SATURATION: 97 % | RESPIRATION RATE: 18 BRPM

## 2023-07-15 DIAGNOSIS — Z98.890 OTHER SPECIFIED POSTPROCEDURAL STATES: Chronic | ICD-10-CM

## 2023-07-15 DIAGNOSIS — Z98.42 CATARACT EXTRACTION STATUS, LEFT EYE: Chronic | ICD-10-CM

## 2023-07-15 PROBLEM — I10 ESSENTIAL (PRIMARY) HYPERTENSION: Chronic | Status: ACTIVE | Noted: 2020-06-27

## 2023-07-15 PROBLEM — M54.9 DORSALGIA, UNSPECIFIED: Chronic | Status: ACTIVE | Noted: 2020-06-27

## 2023-07-15 PROCEDURE — 73562 X-RAY EXAM OF KNEE 3: CPT | Mod: 26,RT

## 2023-07-15 PROCEDURE — 99284 EMERGENCY DEPT VISIT MOD MDM: CPT | Mod: 25

## 2023-07-15 PROCEDURE — 99284 EMERGENCY DEPT VISIT MOD MDM: CPT

## 2023-07-15 PROCEDURE — 73030 X-RAY EXAM OF SHOULDER: CPT | Mod: 26,RT

## 2023-07-15 PROCEDURE — 73030 X-RAY EXAM OF SHOULDER: CPT

## 2023-07-15 PROCEDURE — 70450 CT HEAD/BRAIN W/O DYE: CPT | Mod: MA

## 2023-07-15 PROCEDURE — 70450 CT HEAD/BRAIN W/O DYE: CPT | Mod: 26,MA

## 2023-07-15 PROCEDURE — 73562 X-RAY EXAM OF KNEE 3: CPT

## 2023-07-15 NOTE — ED PROVIDER NOTE - CLINICAL SUMMARY MEDICAL DECISION MAKING FREE TEXT BOX
83-year-old female complaining of injuries after falling at home yesterday.  States she tripped and fell in the kitchen.  Complaining of bump on head right shoulder injury and right knee injury.  Denies LOC nausea vomiting visual disturbance neck or back pain or other injury or symptom.  Took ibuprofen at home with some improvement.    Physical exam reveals no significant injury.  Plan is CT brain x-ray shoulder and knee to rule out fracture or intracranial hemorrhage.

## 2023-07-15 NOTE — ED ADULT NURSE NOTE - OBJECTIVE STATEMENT
Complaining of pain right head, right shoulder and arm, right knee. Pt stated she fell yesterday, NO LOC. Gait steady. PERRL. Pt stated pain is worst right shoulder and right upper arm, pulses palpable. Abrasion to right knee noted. Pt able to ambulate without difficulty. Not on blood thinners.

## 2023-07-15 NOTE — ED PROVIDER NOTE - CARE PLAN
1 Principal Discharge DX:	Fall  Secondary Diagnosis:	Scalp contusion  Secondary Diagnosis:	Knee abrasion   Principal Discharge DX:	Shoulder sprain  Secondary Diagnosis:	Scalp contusion  Secondary Diagnosis:	Knee abrasion

## 2023-07-15 NOTE — ED PROVIDER NOTE - CARE PROVIDER_API CALL
Braden Nance  Orthopaedic Surgery  [address], [address]  [place] [address]  Phone: [phone]  Fax: [phone]  Follow Up Time: 1-3 Days

## 2023-07-15 NOTE — ED ADULT NURSE NOTE - NSFALLRISKINTERV_ED_ALL_ED

## 2023-07-15 NOTE — ED PROVIDER NOTE - OBJECTIVE STATEMENT
83-year-old female complaining of injuries after falling at home yesterday.  States she tripped and fell in the kitchen.  Complaining of bump on head right shoulder injury and right knee injury.  Denies LOC nausea vomiting visual disturbance neck or back pain or other injury or symptom.  Took ibuprofen at home with some improvement.

## 2023-07-15 NOTE — ED ADULT NURSE NOTE - BREATHING, MLM
[FreeTextEntry1] : 9-12-18 labs reviewed with patient.\par A pre-and post spirogram was performed today. This reveals severe mixed restriction and obstruction with mild airway reactivity.\par EKG performed today. NSR at 68 bpm, left axis, first degree AVB, IIVCD, QT .414, fair R wave progression V1-6 with flat T in III. No acute ST-T abn and first degree AVB is new c/w 7-18-13 EKG.\par 
Spontaneous, unlabored and symmetrical

## 2023-07-15 NOTE — ED PROVIDER NOTE - MUSCULOSKELETAL, MLM
Spine appears normal, range of motion is not limited, Minor tenderness right shoulder and right knee.  No bony deformity.  Full range of motion intact.

## 2023-07-15 NOTE — ED PROVIDER NOTE - ENMT, MLM
Airway patent, Nasal mucosa clear. Mouth with normal mucosa. Throat has no vesicles, no oropharyngeal exudates and uvula is midline.   scalp is nontender nondeformed atraumatic.  Neck is supple full range of motion intact.

## 2023-07-15 NOTE — ED ADULT NURSE NOTE - NSICDXPASTMEDICALHX_GEN_ALL_CORE_FT
PAST MEDICAL HISTORY:  Back pain     Chronic cough     HTN (hypertension)     Hypertension     S/P dilatation and curettage

## 2023-07-15 NOTE — ED PROVIDER NOTE - PATIENT PORTAL LINK FT
You can access the FollowMyHealth Patient Portal offered by [facility] by registering at the following website: [email or web address]. By joining [email or web address]’s FollowMyHealth portal, you will also be able to view your health information using other applications (apps) compatible with our system.

## 2023-08-16 NOTE — ED ADULT NURSE NOTE - OBJECTIVE STATEMENT
Breech Presentation/Other
pt comes from home c/o abd discomfort and mid back pain. pt was seen here 3 days ago had _ stone villalba ct scan and given tramadol, was told to f/u with orthopedic but never got the chance too, pt with lidocaine patch to right mid thoracic area. pt thinks shes constipated bc she is " bloated" but had small BM today.

## 2023-10-06 ENCOUNTER — OUTPATIENT (OUTPATIENT)
Dept: OUTPATIENT SERVICES | Facility: HOSPITAL | Age: 83
LOS: 1 days | End: 2023-10-06
Payer: MEDICARE

## 2023-10-06 DIAGNOSIS — R60.0 LOCALIZED EDEMA: ICD-10-CM

## 2023-10-06 DIAGNOSIS — I10 ESSENTIAL (PRIMARY) HYPERTENSION: ICD-10-CM

## 2023-10-06 DIAGNOSIS — L97.322 NON-PRESSURE CHRONIC ULCER OF LEFT ANKLE WITH FAT LAYER EXPOSED: ICD-10-CM

## 2023-10-06 DIAGNOSIS — S91.302A UNSPECIFIED OPEN WOUND, LEFT FOOT, INITIAL ENCOUNTER: ICD-10-CM

## 2023-10-06 DIAGNOSIS — Z98.42 CATARACT EXTRACTION STATUS, LEFT EYE: Chronic | ICD-10-CM

## 2023-10-06 DIAGNOSIS — Z98.890 OTHER SPECIFIED POSTPROCEDURAL STATES: Chronic | ICD-10-CM

## 2023-10-06 PROCEDURE — 73610 X-RAY EXAM OF ANKLE: CPT | Mod: LT

## 2023-10-06 PROCEDURE — 11042 DBRDMT SUBQ TIS 1ST 20SQCM/<: CPT | Mod: 59

## 2023-10-06 PROCEDURE — 99203 OFFICE O/P NEW LOW 30 MIN: CPT | Mod: 25

## 2023-10-06 PROCEDURE — 73610 X-RAY EXAM OF ANKLE: CPT | Mod: 26,LT

## 2023-10-06 PROCEDURE — 93923 UPR/LXTR ART STDY 3+ LVLS: CPT | Mod: 26

## 2023-10-06 PROCEDURE — 93923 UPR/LXTR ART STDY 3+ LVLS: CPT

## 2023-10-16 ENCOUNTER — OUTPATIENT (OUTPATIENT)
Dept: OUTPATIENT SERVICES | Facility: HOSPITAL | Age: 83
LOS: 1 days | End: 2023-10-16
Payer: MEDICARE

## 2023-10-16 DIAGNOSIS — R60.0 LOCALIZED EDEMA: ICD-10-CM

## 2023-10-16 DIAGNOSIS — L97.322 NON-PRESSURE CHRONIC ULCER OF LEFT ANKLE WITH FAT LAYER EXPOSED: ICD-10-CM

## 2023-10-16 DIAGNOSIS — I10 ESSENTIAL (PRIMARY) HYPERTENSION: ICD-10-CM

## 2023-10-16 DIAGNOSIS — Z98.890 OTHER SPECIFIED POSTPROCEDURAL STATES: Chronic | ICD-10-CM

## 2023-10-16 DIAGNOSIS — Z98.42 CATARACT EXTRACTION STATUS, LEFT EYE: Chronic | ICD-10-CM

## 2023-10-16 DIAGNOSIS — S91.302A UNSPECIFIED OPEN WOUND, LEFT FOOT, INITIAL ENCOUNTER: ICD-10-CM

## 2023-10-16 PROCEDURE — 29580 STRAPPING UNNA BOOT: CPT | Mod: LT

## 2023-10-23 ENCOUNTER — OUTPATIENT (OUTPATIENT)
Dept: OUTPATIENT SERVICES | Facility: HOSPITAL | Age: 83
LOS: 1 days | End: 2023-10-23
Payer: MEDICARE

## 2023-10-23 DIAGNOSIS — R60.0 LOCALIZED EDEMA: ICD-10-CM

## 2023-10-23 DIAGNOSIS — Z98.42 CATARACT EXTRACTION STATUS, LEFT EYE: Chronic | ICD-10-CM

## 2023-10-23 DIAGNOSIS — L97.322 NON-PRESSURE CHRONIC ULCER OF LEFT ANKLE WITH FAT LAYER EXPOSED: ICD-10-CM

## 2023-10-23 DIAGNOSIS — I10 ESSENTIAL (PRIMARY) HYPERTENSION: ICD-10-CM

## 2023-10-23 DIAGNOSIS — Z98.890 OTHER SPECIFIED POSTPROCEDURAL STATES: Chronic | ICD-10-CM

## 2023-10-23 DIAGNOSIS — S91.302A UNSPECIFIED OPEN WOUND, LEFT FOOT, INITIAL ENCOUNTER: ICD-10-CM

## 2023-10-23 DIAGNOSIS — R60.9 EDEMA, UNSPECIFIED: ICD-10-CM

## 2023-10-23 PROCEDURE — 29580 STRAPPING UNNA BOOT: CPT | Mod: 59,LT

## 2023-10-23 PROCEDURE — 11042 DBRDMT SUBQ TIS 1ST 20SQCM/<: CPT

## 2023-10-30 ENCOUNTER — OUTPATIENT (OUTPATIENT)
Dept: OUTPATIENT SERVICES | Facility: HOSPITAL | Age: 83
LOS: 1 days | End: 2023-10-30
Payer: MEDICARE

## 2023-10-30 DIAGNOSIS — S91.302A UNSPECIFIED OPEN WOUND, LEFT FOOT, INITIAL ENCOUNTER: ICD-10-CM

## 2023-10-30 DIAGNOSIS — L97.322 NON-PRESSURE CHRONIC ULCER OF LEFT ANKLE WITH FAT LAYER EXPOSED: ICD-10-CM

## 2023-10-30 DIAGNOSIS — I10 ESSENTIAL (PRIMARY) HYPERTENSION: ICD-10-CM

## 2023-10-30 DIAGNOSIS — R60.0 LOCALIZED EDEMA: ICD-10-CM

## 2023-10-30 DIAGNOSIS — Z98.42 CATARACT EXTRACTION STATUS, LEFT EYE: Chronic | ICD-10-CM

## 2023-10-30 DIAGNOSIS — Z98.890 OTHER SPECIFIED POSTPROCEDURAL STATES: Chronic | ICD-10-CM

## 2023-10-30 PROCEDURE — 11042 DBRDMT SUBQ TIS 1ST 20SQCM/<: CPT

## 2023-10-30 PROCEDURE — 29580 STRAPPING UNNA BOOT: CPT | Mod: 59,LT

## 2023-11-06 ENCOUNTER — OUTPATIENT (OUTPATIENT)
Dept: OUTPATIENT SERVICES | Facility: HOSPITAL | Age: 83
LOS: 1 days | End: 2023-11-06
Payer: MEDICARE

## 2023-11-06 DIAGNOSIS — L97.322 NON-PRESSURE CHRONIC ULCER OF LEFT ANKLE WITH FAT LAYER EXPOSED: ICD-10-CM

## 2023-11-06 DIAGNOSIS — R60.0 LOCALIZED EDEMA: ICD-10-CM

## 2023-11-06 DIAGNOSIS — Z98.890 OTHER SPECIFIED POSTPROCEDURAL STATES: Chronic | ICD-10-CM

## 2023-11-06 DIAGNOSIS — S91.302A UNSPECIFIED OPEN WOUND, LEFT FOOT, INITIAL ENCOUNTER: ICD-10-CM

## 2023-11-06 DIAGNOSIS — Z98.42 CATARACT EXTRACTION STATUS, LEFT EYE: Chronic | ICD-10-CM

## 2023-11-06 DIAGNOSIS — I10 ESSENTIAL (PRIMARY) HYPERTENSION: ICD-10-CM

## 2023-11-06 PROCEDURE — 11042 DBRDMT SUBQ TIS 1ST 20SQCM/<: CPT

## 2023-11-06 PROCEDURE — 29580 STRAPPING UNNA BOOT: CPT | Mod: 59,LT

## 2023-11-13 ENCOUNTER — OUTPATIENT (OUTPATIENT)
Dept: OUTPATIENT SERVICES | Facility: HOSPITAL | Age: 83
LOS: 1 days | End: 2023-11-13
Payer: MEDICARE

## 2023-11-13 DIAGNOSIS — Z98.890 OTHER SPECIFIED POSTPROCEDURAL STATES: Chronic | ICD-10-CM

## 2023-11-13 DIAGNOSIS — R60.0 LOCALIZED EDEMA: ICD-10-CM

## 2023-11-13 DIAGNOSIS — I10 ESSENTIAL (PRIMARY) HYPERTENSION: ICD-10-CM

## 2023-11-13 DIAGNOSIS — L97.322 NON-PRESSURE CHRONIC ULCER OF LEFT ANKLE WITH FAT LAYER EXPOSED: ICD-10-CM

## 2023-11-13 DIAGNOSIS — S91.302A UNSPECIFIED OPEN WOUND, LEFT FOOT, INITIAL ENCOUNTER: ICD-10-CM

## 2023-11-13 DIAGNOSIS — Z98.42 CATARACT EXTRACTION STATUS, LEFT EYE: Chronic | ICD-10-CM

## 2023-11-13 PROCEDURE — 29580 STRAPPING UNNA BOOT: CPT | Mod: LT

## 2023-11-20 ENCOUNTER — OUTPATIENT (OUTPATIENT)
Dept: OUTPATIENT SERVICES | Facility: HOSPITAL | Age: 83
LOS: 1 days | End: 2023-11-20
Payer: MEDICARE

## 2023-11-20 DIAGNOSIS — I10 ESSENTIAL (PRIMARY) HYPERTENSION: ICD-10-CM

## 2023-11-20 DIAGNOSIS — Z98.890 OTHER SPECIFIED POSTPROCEDURAL STATES: Chronic | ICD-10-CM

## 2023-11-20 DIAGNOSIS — R60.0 LOCALIZED EDEMA: ICD-10-CM

## 2023-11-20 DIAGNOSIS — L97.322 NON-PRESSURE CHRONIC ULCER OF LEFT ANKLE WITH FAT LAYER EXPOSED: ICD-10-CM

## 2023-11-20 DIAGNOSIS — Z98.42 CATARACT EXTRACTION STATUS, LEFT EYE: Chronic | ICD-10-CM

## 2023-11-20 DIAGNOSIS — S91.302A UNSPECIFIED OPEN WOUND, LEFT FOOT, INITIAL ENCOUNTER: ICD-10-CM

## 2023-11-20 PROCEDURE — 29580 STRAPPING UNNA BOOT: CPT | Mod: LT

## 2023-11-27 ENCOUNTER — OUTPATIENT (OUTPATIENT)
Dept: OUTPATIENT SERVICES | Facility: HOSPITAL | Age: 83
LOS: 1 days | End: 2023-11-27
Payer: MEDICARE

## 2023-11-27 DIAGNOSIS — Z98.890 OTHER SPECIFIED POSTPROCEDURAL STATES: Chronic | ICD-10-CM

## 2023-11-27 DIAGNOSIS — L97.322 NON-PRESSURE CHRONIC ULCER OF LEFT ANKLE WITH FAT LAYER EXPOSED: ICD-10-CM

## 2023-11-27 DIAGNOSIS — I10 ESSENTIAL (PRIMARY) HYPERTENSION: ICD-10-CM

## 2023-11-27 DIAGNOSIS — R60.0 LOCALIZED EDEMA: ICD-10-CM

## 2023-11-27 DIAGNOSIS — S91.302A UNSPECIFIED OPEN WOUND, LEFT FOOT, INITIAL ENCOUNTER: ICD-10-CM

## 2023-11-27 DIAGNOSIS — Z98.42 CATARACT EXTRACTION STATUS, LEFT EYE: Chronic | ICD-10-CM

## 2023-11-27 PROCEDURE — 29580 STRAPPING UNNA BOOT: CPT | Mod: 59,LT

## 2023-11-27 PROCEDURE — 11042 DBRDMT SUBQ TIS 1ST 20SQCM/<: CPT

## 2023-12-04 ENCOUNTER — OUTPATIENT (OUTPATIENT)
Dept: OUTPATIENT SERVICES | Facility: HOSPITAL | Age: 83
LOS: 1 days | End: 2023-12-04
Payer: MEDICARE

## 2023-12-04 DIAGNOSIS — L97.322 NON-PRESSURE CHRONIC ULCER OF LEFT ANKLE WITH FAT LAYER EXPOSED: ICD-10-CM

## 2023-12-04 DIAGNOSIS — Z98.890 OTHER SPECIFIED POSTPROCEDURAL STATES: Chronic | ICD-10-CM

## 2023-12-04 DIAGNOSIS — I10 ESSENTIAL (PRIMARY) HYPERTENSION: ICD-10-CM

## 2023-12-04 DIAGNOSIS — Z98.42 CATARACT EXTRACTION STATUS, LEFT EYE: Chronic | ICD-10-CM

## 2023-12-04 DIAGNOSIS — R60.0 LOCALIZED EDEMA: ICD-10-CM

## 2023-12-04 DIAGNOSIS — S91.302A UNSPECIFIED OPEN WOUND, LEFT FOOT, INITIAL ENCOUNTER: ICD-10-CM

## 2023-12-04 PROCEDURE — 11042 DBRDMT SUBQ TIS 1ST 20SQCM/<: CPT

## 2023-12-04 PROCEDURE — 29580 STRAPPING UNNA BOOT: CPT | Mod: 59,LT

## 2023-12-11 ENCOUNTER — OUTPATIENT (OUTPATIENT)
Dept: OUTPATIENT SERVICES | Facility: HOSPITAL | Age: 83
LOS: 1 days | End: 2023-12-11
Payer: MEDICARE

## 2023-12-11 DIAGNOSIS — S91.302A UNSPECIFIED OPEN WOUND, LEFT FOOT, INITIAL ENCOUNTER: ICD-10-CM

## 2023-12-11 DIAGNOSIS — Z98.890 OTHER SPECIFIED POSTPROCEDURAL STATES: Chronic | ICD-10-CM

## 2023-12-11 DIAGNOSIS — R60.0 LOCALIZED EDEMA: ICD-10-CM

## 2023-12-11 DIAGNOSIS — I10 ESSENTIAL (PRIMARY) HYPERTENSION: ICD-10-CM

## 2023-12-11 DIAGNOSIS — L97.322 NON-PRESSURE CHRONIC ULCER OF LEFT ANKLE WITH FAT LAYER EXPOSED: ICD-10-CM

## 2023-12-11 DIAGNOSIS — Z98.42 CATARACT EXTRACTION STATUS, LEFT EYE: Chronic | ICD-10-CM

## 2023-12-11 PROCEDURE — 29580 STRAPPING UNNA BOOT: CPT | Mod: LT

## 2023-12-18 ENCOUNTER — OUTPATIENT (OUTPATIENT)
Dept: OUTPATIENT SERVICES | Facility: HOSPITAL | Age: 83
LOS: 1 days | End: 2023-12-18
Payer: MEDICARE

## 2023-12-18 DIAGNOSIS — Z98.890 OTHER SPECIFIED POSTPROCEDURAL STATES: Chronic | ICD-10-CM

## 2023-12-18 DIAGNOSIS — R60.0 LOCALIZED EDEMA: ICD-10-CM

## 2023-12-18 DIAGNOSIS — Z98.42 CATARACT EXTRACTION STATUS, LEFT EYE: Chronic | ICD-10-CM

## 2023-12-18 DIAGNOSIS — L97.322 NON-PRESSURE CHRONIC ULCER OF LEFT ANKLE WITH FAT LAYER EXPOSED: ICD-10-CM

## 2023-12-18 DIAGNOSIS — S91.302A UNSPECIFIED OPEN WOUND, LEFT FOOT, INITIAL ENCOUNTER: ICD-10-CM

## 2023-12-18 DIAGNOSIS — I10 ESSENTIAL (PRIMARY) HYPERTENSION: ICD-10-CM

## 2023-12-18 PROCEDURE — 29580 STRAPPING UNNA BOOT: CPT | Mod: LT

## 2023-12-19 ENCOUNTER — NON-APPOINTMENT (OUTPATIENT)
Age: 83
End: 2023-12-19

## 2023-12-29 ENCOUNTER — OUTPATIENT (OUTPATIENT)
Dept: OUTPATIENT SERVICES | Facility: HOSPITAL | Age: 83
LOS: 1 days | End: 2023-12-29
Payer: MEDICARE

## 2023-12-29 DIAGNOSIS — R60.0 LOCALIZED EDEMA: ICD-10-CM

## 2023-12-29 DIAGNOSIS — I10 ESSENTIAL (PRIMARY) HYPERTENSION: ICD-10-CM

## 2023-12-29 DIAGNOSIS — Z98.42 CATARACT EXTRACTION STATUS, LEFT EYE: Chronic | ICD-10-CM

## 2023-12-29 DIAGNOSIS — S91.302A UNSPECIFIED OPEN WOUND, LEFT FOOT, INITIAL ENCOUNTER: ICD-10-CM

## 2023-12-29 DIAGNOSIS — Z98.890 OTHER SPECIFIED POSTPROCEDURAL STATES: Chronic | ICD-10-CM

## 2023-12-29 PROCEDURE — 99213 OFFICE O/P EST LOW 20 MIN: CPT

## 2024-02-07 NOTE — ED ADULT TRIAGE NOTE - PAIN RATING/NUMBER SCALE (0-10): ACTIVITY
Called patient no answer, left voicemail for patient to return call to Sonia Jacinto MD office at [phone].    ECO Representative: Please reach out to [address] [facility] via Epic Secure Chat to see if a team member is available to take patient's call.    If team member is unavailable, please document in this encounter that patient returned call and route to: TYRONE JACINTO NURSE MSG POOL [[phone]].    10

## 2024-02-09 ENCOUNTER — OFFICE (OUTPATIENT)
Dept: URBAN - METROPOLITAN AREA CLINIC 102 | Facility: CLINIC | Age: 84
Setting detail: OPHTHALMOLOGY
End: 2024-02-09
Payer: MEDICARE

## 2024-02-09 ENCOUNTER — RX ONLY (RX ONLY)
Age: 84
End: 2024-02-09

## 2024-02-09 DIAGNOSIS — H16.222: ICD-10-CM

## 2024-02-09 DIAGNOSIS — G24.5: ICD-10-CM

## 2024-02-09 DIAGNOSIS — Z96.1: ICD-10-CM

## 2024-02-09 DIAGNOSIS — H26.493: ICD-10-CM

## 2024-02-09 DIAGNOSIS — H18.831: ICD-10-CM

## 2024-02-09 DIAGNOSIS — H40.013: ICD-10-CM

## 2024-02-09 PROCEDURE — 92133 CPTRZD OPH DX IMG PST SGM ON: CPT | Performed by: OPHTHALMOLOGY

## 2024-02-09 PROCEDURE — 92014 COMPRE OPH EXAM EST PT 1/>: CPT | Performed by: OPHTHALMOLOGY

## 2024-02-09 PROCEDURE — 92083 EXTENDED VISUAL FIELD XM: CPT | Performed by: OPHTHALMOLOGY

## 2024-02-09 ASSESSMENT — CONFRONTATIONAL VISUAL FIELD TEST (CVF)
OS_FINDINGS: FULL
OD_FINDINGS: FULL

## 2024-02-09 ASSESSMENT — REFRACTION_CURRENTRX
OD_OVR_VA: 20/
OS_OVR_VA: 20/
OS_SPHERE: PLANO
OD_CYLINDER: -0.50
OD_VPRISM_DIRECTION: SV
OD_CYLINDER: -0.50
OD_SPHERE: -0.50
OS_AXIS: 005
OD_OVR_VA: 20/
OD_SPHERE: -0.50
OS_VPRISM_DIRECTION: SV
OD_AXIS: 169
OD_AXIS: 164
OS_AXIS: 006
OS_SPHERE: PLANO
OS_OVR_VA: 20/
OD_VPRISM_DIRECTION: SV
OS_CYLINDER: -0.75
OS_VPRISM_DIRECTION: SV
OS_CYLINDER: -0.75

## 2024-02-09 ASSESSMENT — REFRACTION_AUTOREFRACTION
OD_SPHERE: +1.00
OS_SPHERE: +0.50
OD_AXIS: 004
OS_CYLINDER: -0.50
OS_AXIS: 123
OD_CYLINDER: -1.00

## 2024-02-09 ASSESSMENT — SUPERFICIAL PUNCTATE KERATITIS (SPK): OS_SPK: T

## 2024-02-09 ASSESSMENT — REFRACTION_MANIFEST
OS_SPHERE: +0.25
OS_AXIS: 105
OS_CYLINDER: -1.75
OS_VA1: 20/80
OD_VA1: 20/40

## 2024-02-09 ASSESSMENT — SPHEQUIV_DERIVED
OD_SPHEQUIV: 0.5
OS_SPHEQUIV: 0.25
OS_SPHEQUIV: -0.625

## 2024-02-09 ASSESSMENT — DECREASING TEAR LAKE - SEVERITY SCORE
OD_DEC_TEARLAKE: 1+
OS_DEC_TEARLAKE: 1+

## 2024-09-25 ENCOUNTER — NON-APPOINTMENT (OUTPATIENT)
Age: 84
End: 2024-09-25

## 2025-02-10 ENCOUNTER — OFFICE (OUTPATIENT)
Dept: URBAN - METROPOLITAN AREA CLINIC 102 | Facility: CLINIC | Age: 85
Setting detail: OPHTHALMOLOGY
End: 2025-02-10
Payer: MEDICARE

## 2025-02-10 DIAGNOSIS — H18.831: ICD-10-CM

## 2025-02-10 DIAGNOSIS — G24.5: ICD-10-CM

## 2025-02-10 DIAGNOSIS — H16.222: ICD-10-CM

## 2025-02-10 DIAGNOSIS — Z96.1: ICD-10-CM

## 2025-02-10 DIAGNOSIS — H26.493: ICD-10-CM

## 2025-02-10 DIAGNOSIS — H40.013: ICD-10-CM

## 2025-02-10 PROCEDURE — 92083 EXTENDED VISUAL FIELD XM: CPT | Performed by: OPHTHALMOLOGY

## 2025-02-10 PROCEDURE — 92133 CPTRZD OPH DX IMG PST SGM ON: CPT | Performed by: OPHTHALMOLOGY

## 2025-02-10 PROCEDURE — 92014 COMPRE OPH EXAM EST PT 1/>: CPT | Performed by: OPHTHALMOLOGY

## 2025-02-10 ASSESSMENT — KERATOMETRY
OS_AXISANGLE_DEGREES: 74
OD_K1POWER_DIOPTERS: 42.75
OD_K2POWER_DIOPTERS: 43.75
METHOD_AUTO_MANUAL: AUTO
OD_AXISANGLE_DEGREES: 100
OS_K1POWER_DIOPTERS: 43.00
OS_K2POWER_DIOPTERS: 43.75

## 2025-02-10 ASSESSMENT — REFRACTION_AUTOREFRACTION
OD_SPHERE: +1.00
OD_AXIS: 006
OD_CYLINDER: -1.25
OS_AXIS: 128
OS_SPHERE: +0.50
OS_CYLINDER: -0.75

## 2025-02-10 ASSESSMENT — REFRACTION_CURRENTRX
OD_OVR_VA: 20/
OS_SPHERE: PLANO
OD_SPHERE: -0.50
OS_OVR_VA: 20/
OD_CYLINDER: -0.50
OS_SPHERE: PLANO
OD_AXIS: 169
OD_CYLINDER: -0.50
OD_SPHERE: -0.50
OD_VPRISM_DIRECTION: SV
OS_CYLINDER: -0.75
OS_CYLINDER: -0.75
OD_VPRISM_DIRECTION: SV
OD_AXIS: 164
OS_VPRISM_DIRECTION: SV
OS_OVR_VA: 20/
OS_AXIS: 006
OD_OVR_VA: 20/
OS_AXIS: 005
OS_VPRISM_DIRECTION: SV

## 2025-02-10 ASSESSMENT — SUPERFICIAL PUNCTATE KERATITIS (SPK): OS_SPK: T

## 2025-02-10 ASSESSMENT — TONOMETRY
OS_IOP_MMHG: 14
OD_IOP_MMHG: 15
OD_IOP_MMHG: 20
OS_IOP_MMHG: 18

## 2025-02-10 ASSESSMENT — REFRACTION_MANIFEST
OD_VA1: 20/40
OS_VA1: 20/80
OS_CYLINDER: -1.75
OS_AXIS: 105
OS_SPHERE: +0.25

## 2025-02-10 ASSESSMENT — CONFRONTATIONAL VISUAL FIELD TEST (CVF)
OS_FINDINGS: FULL
OD_FINDINGS: FULL

## 2025-02-10 ASSESSMENT — VISUAL ACUITY
OD_BCVA: 20/25-1
OS_BCVA: 20/30-2

## 2025-02-10 ASSESSMENT — DECREASING TEAR LAKE - SEVERITY SCORE
OS_DEC_TEARLAKE: 1+
OD_DEC_TEARLAKE: 1+

## 2025-04-01 ENCOUNTER — APPOINTMENT (OUTPATIENT)
Facility: CLINIC | Age: 85
End: 2025-04-01
Payer: MEDICARE

## 2025-04-01 VITALS
RESPIRATION RATE: 18 BRPM | OXYGEN SATURATION: 96 % | WEIGHT: 111 LBS | HEIGHT: 63 IN | BODY MASS INDEX: 19.67 KG/M2 | TEMPERATURE: 98.7 F | HEART RATE: 61 BPM | SYSTOLIC BLOOD PRESSURE: 133 MMHG | DIASTOLIC BLOOD PRESSURE: 70 MMHG

## 2025-04-01 DIAGNOSIS — J47.9 BRONCHIECTASIS, UNCOMPLICATED: ICD-10-CM

## 2025-04-01 DIAGNOSIS — R21 RASH AND OTHER NONSPECIFIC SKIN ERUPTION: ICD-10-CM

## 2025-04-01 DIAGNOSIS — Z82.49 FAMILY HISTORY OF ISCHEMIC HEART DISEASE AND OTHER DISEASES OF THE CIRCULATORY SYSTEM: ICD-10-CM

## 2025-04-01 DIAGNOSIS — M25.562 PAIN IN LEFT KNEE: ICD-10-CM

## 2025-04-01 DIAGNOSIS — I10 ESSENTIAL (PRIMARY) HYPERTENSION: ICD-10-CM

## 2025-04-01 DIAGNOSIS — M40.209 UNSPECIFIED KYPHOSIS, SITE UNSPECIFIED: ICD-10-CM

## 2025-04-01 DIAGNOSIS — A31.0 PULMONARY MYCOBACTERIAL INFECTION: ICD-10-CM

## 2025-04-01 DIAGNOSIS — H91.90 UNSPECIFIED HEARING LOSS, UNSPECIFIED EAR: ICD-10-CM

## 2025-04-01 PROCEDURE — 99213 OFFICE O/P EST LOW 20 MIN: CPT | Mod: 25

## 2025-04-01 PROCEDURE — 94060 EVALUATION OF WHEEZING: CPT

## 2025-04-01 PROCEDURE — 94727 GAS DIL/WSHOT DETER LNG VOL: CPT

## 2025-04-01 PROCEDURE — 94729 DIFFUSING CAPACITY: CPT

## 2025-08-26 ENCOUNTER — APPOINTMENT (OUTPATIENT)
Dept: INTERNAL MEDICINE | Facility: CLINIC | Age: 85
End: 2025-08-26
Payer: MEDICARE

## 2025-08-26 VITALS
HEIGHT: 61 IN | HEART RATE: 55 BPM | BODY MASS INDEX: 20.22 KG/M2 | OXYGEN SATURATION: 99 % | WEIGHT: 107.13 LBS | SYSTOLIC BLOOD PRESSURE: 138 MMHG | TEMPERATURE: 97.5 F | DIASTOLIC BLOOD PRESSURE: 72 MMHG

## 2025-08-26 DIAGNOSIS — M25.562 PAIN IN LEFT KNEE: ICD-10-CM

## 2025-08-26 DIAGNOSIS — A31.0 PULMONARY MYCOBACTERIAL INFECTION: ICD-10-CM

## 2025-08-26 DIAGNOSIS — J47.9 BRONCHIECTASIS, UNCOMPLICATED: ICD-10-CM

## 2025-08-26 DIAGNOSIS — Z86.19 PERSONAL HISTORY OF OTHER INFECTIOUS AND PARASITIC DISEASES: ICD-10-CM

## 2025-08-26 DIAGNOSIS — R31.29 OTHER MICROSCOPIC HEMATURIA: ICD-10-CM

## 2025-08-26 DIAGNOSIS — Z00.00 ENCOUNTER FOR GENERAL ADULT MEDICAL EXAMINATION W/OUT ABNORMAL FINDINGS: ICD-10-CM

## 2025-08-26 DIAGNOSIS — I10 ESSENTIAL (PRIMARY) HYPERTENSION: ICD-10-CM

## 2025-08-26 DIAGNOSIS — Z87.898 PERSONAL HISTORY OF OTHER SPECIFIED CONDITIONS: ICD-10-CM

## 2025-08-26 DIAGNOSIS — Z86.69 PERSONAL HISTORY OF OTHER DISEASES OF THE NERVOUS SYSTEM AND SENSE ORGANS: ICD-10-CM

## 2025-08-26 DIAGNOSIS — M40.209 UNSPECIFIED KYPHOSIS, SITE UNSPECIFIED: ICD-10-CM

## 2025-08-26 DIAGNOSIS — Z13.228 ENCOUNTER FOR SCREENING FOR OTHER METABOLIC DISORDERS: ICD-10-CM

## 2025-08-26 DIAGNOSIS — R21 RASH AND OTHER NONSPECIFIC SKIN ERUPTION: ICD-10-CM

## 2025-08-26 PROCEDURE — 99204 OFFICE O/P NEW MOD 45 MIN: CPT

## 2025-08-26 PROCEDURE — G2211 COMPLEX E/M VISIT ADD ON: CPT

## 2025-09-05 ENCOUNTER — APPOINTMENT (OUTPATIENT)
Dept: CT IMAGING | Facility: CLINIC | Age: 85
End: 2025-09-05

## 2025-09-05 ENCOUNTER — OUTPATIENT (OUTPATIENT)
Dept: OUTPATIENT SERVICES | Facility: HOSPITAL | Age: 85
LOS: 1 days | End: 2025-09-05
Payer: MEDICARE

## 2025-09-05 DIAGNOSIS — Z98.42 CATARACT EXTRACTION STATUS, LEFT EYE: Chronic | ICD-10-CM

## 2025-09-05 DIAGNOSIS — A31.0 PULMONARY MYCOBACTERIAL INFECTION: ICD-10-CM

## 2025-09-05 DIAGNOSIS — Z98.890 OTHER SPECIFIED POSTPROCEDURAL STATES: Chronic | ICD-10-CM

## 2025-09-05 DIAGNOSIS — R47.9 UNSPECIFIED SPEECH DISTURBANCES: ICD-10-CM

## 2025-09-05 PROCEDURE — 71250 CT THORAX DX C-: CPT

## 2025-09-05 PROCEDURE — 71250 CT THORAX DX C-: CPT | Mod: 26
